# Patient Record
Sex: MALE | Race: OTHER | Employment: OTHER | URBAN - NONMETROPOLITAN AREA
[De-identification: names, ages, dates, MRNs, and addresses within clinical notes are randomized per-mention and may not be internally consistent; named-entity substitution may affect disease eponyms.]

---

## 2016-08-23 LAB
BUN BLDV-MCNC: 15 MG/DL
CHLORIDE: 105
CHLORIDE: NORMAL
CHOLESTEROL, TOTAL: 196 MG/DL
CHOLESTEROL/HDL RATIO: 2.8
CREATININE: 0.9 MG/DL
GFR CALCULATED: NORMAL
HDLC SERPL-MCNC: 3 MG/DL (ref 35–70)
LDL CHOLESTEROL CALCULATED: 71 MG/DL (ref 0–160)
POTASSIUM: 4.1
SODIUM: 142
TRIGL SERPL-MCNC: 62 MG/DL
VLDLC SERPL CALC-MCNC: 12 MG/DL

## 2017-05-17 ENCOUNTER — TELEPHONE (OUTPATIENT)
Dept: FAMILY MEDICINE CLINIC | Age: 62
End: 2017-05-17

## 2017-06-01 DIAGNOSIS — I35.1 AORTIC VALVE INSUFFICIENCY, UNSPECIFIED ETIOLOGY: ICD-10-CM

## 2017-06-01 DIAGNOSIS — F33.9 EPISODE OF RECURRENT MAJOR DEPRESSIVE DISORDER, UNSPECIFIED DEPRESSION EPISODE SEVERITY (HCC): ICD-10-CM

## 2017-06-01 PROBLEM — F32.9 MAJOR DEPRESSION: Status: ACTIVE | Noted: 2017-06-01

## 2017-06-01 RX ORDER — LISINOPRIL 40 MG/1
TABLET ORAL
Refills: 0 | COMMUNITY
Start: 2017-05-27 | End: 2017-06-07 | Stop reason: SDUPTHER

## 2017-06-01 RX ORDER — SPIRONOLACTONE 25 MG/1
TABLET ORAL
Refills: 3 | COMMUNITY
Start: 2017-05-26 | End: 2017-06-07 | Stop reason: SDUPTHER

## 2017-06-01 RX ORDER — CITALOPRAM 40 MG/1
TABLET ORAL
Refills: 3 | COMMUNITY
Start: 2017-05-26 | End: 2017-06-07 | Stop reason: SDUPTHER

## 2017-06-07 ENCOUNTER — OFFICE VISIT (OUTPATIENT)
Dept: FAMILY MEDICINE CLINIC | Age: 62
End: 2017-06-07
Payer: COMMERCIAL

## 2017-06-07 VITALS — HEART RATE: 56 BPM | SYSTOLIC BLOOD PRESSURE: 110 MMHG | DIASTOLIC BLOOD PRESSURE: 70 MMHG | WEIGHT: 158 LBS

## 2017-06-07 DIAGNOSIS — I35.1 AORTIC VALVE INSUFFICIENCY, UNSPECIFIED ETIOLOGY: Primary | ICD-10-CM

## 2017-06-07 DIAGNOSIS — F33.9 EPISODE OF RECURRENT MAJOR DEPRESSIVE DISORDER, UNSPECIFIED DEPRESSION EPISODE SEVERITY (HCC): ICD-10-CM

## 2017-06-07 DIAGNOSIS — K59.1 FUNCTIONAL DIARRHEA: ICD-10-CM

## 2017-06-07 PROCEDURE — 99214 OFFICE O/P EST MOD 30 MIN: CPT | Performed by: FAMILY MEDICINE

## 2017-06-07 RX ORDER — SPIRONOLACTONE 25 MG/1
TABLET ORAL
Qty: 90 TABLET | Refills: 1 | Status: SHIPPED | OUTPATIENT
Start: 2017-06-07 | End: 2017-06-26 | Stop reason: SDUPTHER

## 2017-06-07 RX ORDER — CITALOPRAM 40 MG/1
TABLET ORAL
Qty: 90 TABLET | Refills: 1 | Status: SHIPPED | OUTPATIENT
Start: 2017-06-07 | End: 2017-06-26 | Stop reason: SDUPTHER

## 2017-06-07 RX ORDER — MONTELUKAST SODIUM 4 MG/1
1 TABLET, CHEWABLE ORAL 2 TIMES DAILY
Qty: 60 TABLET | Refills: 3 | Status: SHIPPED | OUTPATIENT
Start: 2017-06-07 | End: 2019-03-06

## 2017-06-07 RX ORDER — LISINOPRIL 40 MG/1
TABLET ORAL
Qty: 90 TABLET | Refills: 2 | Status: SHIPPED | OUTPATIENT
Start: 2017-06-07 | End: 2018-03-28 | Stop reason: SDUPTHER

## 2017-06-07 ASSESSMENT — ENCOUNTER SYMPTOMS
DIARRHEA: 1
SHORTNESS OF BREATH: 1
ABDOMINAL PAIN: 0

## 2017-06-26 DIAGNOSIS — F33.9 EPISODE OF RECURRENT MAJOR DEPRESSIVE DISORDER, UNSPECIFIED DEPRESSION EPISODE SEVERITY (HCC): ICD-10-CM

## 2017-06-26 DIAGNOSIS — I35.1 AORTIC VALVE INSUFFICIENCY, UNSPECIFIED ETIOLOGY: ICD-10-CM

## 2017-06-26 RX ORDER — CITALOPRAM 40 MG/1
TABLET ORAL
Qty: 30 TABLET | Refills: 5 | Status: SHIPPED | OUTPATIENT
Start: 2017-06-26 | End: 2017-08-08 | Stop reason: ALTCHOICE

## 2017-06-26 RX ORDER — SPIRONOLACTONE 25 MG/1
TABLET ORAL
Qty: 90 TABLET | Refills: 1 | Status: SHIPPED | OUTPATIENT
Start: 2017-06-26 | End: 2018-03-02 | Stop reason: SDUPTHER

## 2017-07-03 ENCOUNTER — TELEPHONE (OUTPATIENT)
Dept: FAMILY MEDICINE CLINIC | Age: 62
End: 2017-07-03

## 2017-08-07 LAB
AGE FOR GFR: 61
ANION GAP SERPL CALCULATED.3IONS-SCNC: 12 MMOL/L
CHLORIDE BLD-SCNC: 107 MMOL/L
CO2: 27 MMOL/L
CREAT SERPL-MCNC: 0.9 MG/DL
CREATININE, RANDOM: 122.6 MG/DL
EGFR BF: 77 ML/MIN/1.73 M2
EGFR BM: 104 ML/MIN/1.73 M2
EGFR WF: 64 ML/MIN/1.73 M2
EGFR WM: 86 ML/MIN/1.73 M2
MICROALBUMIN UR-MCNC: <0.6 MG/DL
POTASSIUM SERPL-SCNC: 4.6 MMOL/L
SODIUM BLD-SCNC: 141 MMOL/L

## 2017-08-08 ENCOUNTER — OFFICE VISIT (OUTPATIENT)
Dept: FAMILY MEDICINE CLINIC | Age: 62
End: 2017-08-08
Payer: COMMERCIAL

## 2017-08-08 VITALS — SYSTOLIC BLOOD PRESSURE: 128 MMHG | HEART RATE: 56 BPM | WEIGHT: 158 LBS | DIASTOLIC BLOOD PRESSURE: 70 MMHG

## 2017-08-08 DIAGNOSIS — R79.89 ABNORMAL THYROID BLOOD TEST: ICD-10-CM

## 2017-08-08 DIAGNOSIS — K59.1 FUNCTIONAL DIARRHEA: ICD-10-CM

## 2017-08-08 DIAGNOSIS — F33.41 RECURRENT MAJOR DEPRESSIVE DISORDER, IN PARTIAL REMISSION (HCC): Primary | ICD-10-CM

## 2017-08-08 DIAGNOSIS — I35.1 NONRHEUMATIC AORTIC VALVE INSUFFICIENCY: ICD-10-CM

## 2017-08-08 PROCEDURE — 99214 OFFICE O/P EST MOD 30 MIN: CPT | Performed by: FAMILY MEDICINE

## 2017-08-08 RX ORDER — VENLAFAXINE HYDROCHLORIDE 150 MG/1
150 CAPSULE, EXTENDED RELEASE ORAL DAILY
Qty: 30 CAPSULE | Refills: 3 | Status: SHIPPED | OUTPATIENT
Start: 2017-08-08 | End: 2017-12-04 | Stop reason: SDUPTHER

## 2017-08-08 ASSESSMENT — ENCOUNTER SYMPTOMS
ROS SKIN COMMENTS: DRY AND FLAKEY
SHORTNESS OF BREATH: 1

## 2017-08-29 ENCOUNTER — TELEPHONE (OUTPATIENT)
Dept: FAMILY MEDICINE CLINIC | Age: 62
End: 2017-08-29

## 2017-09-08 DIAGNOSIS — I35.1 AORTIC VALVE REGURGITATION, UNSPECIFIED ETIOLOGY: Primary | ICD-10-CM

## 2017-09-12 ENCOUNTER — TELEPHONE (OUTPATIENT)
Dept: CARDIOLOGY | Age: 62
End: 2017-09-12

## 2017-12-04 LAB
AGE FOR GFR: 61
ANION GAP SERPL CALCULATED.3IONS-SCNC: 15 MMOL/L
CHLORIDE BLD-SCNC: 105 MMOL/L
CO2: 27 MMOL/L
CREAT SERPL-MCNC: 1 MG/DL
EGFR BF: 68 ML/MIN/1.73 M2
EGFR BM: 92 ML/MIN/1.73 M2
EGFR WF: 56 ML/MIN/1.73 M2
EGFR WM: 76 ML/MIN/1.73 M2
POTASSIUM SERPL-SCNC: 4.4 MMOL/L
SODIUM BLD-SCNC: 143 MMOL/L
TSH SERPL DL<=0.05 MIU/L-ACNC: 0.6 MIU/ML

## 2017-12-05 ENCOUNTER — OFFICE VISIT (OUTPATIENT)
Dept: FAMILY MEDICINE CLINIC | Age: 62
End: 2017-12-05
Payer: COMMERCIAL

## 2017-12-05 VITALS
WEIGHT: 164 LBS | BODY MASS INDEX: 23.48 KG/M2 | SYSTOLIC BLOOD PRESSURE: 132 MMHG | HEIGHT: 70 IN | DIASTOLIC BLOOD PRESSURE: 74 MMHG | HEART RATE: 80 BPM

## 2017-12-05 DIAGNOSIS — K59.1 FUNCTIONAL DIARRHEA: ICD-10-CM

## 2017-12-05 DIAGNOSIS — Z23 NEED FOR INFLUENZA VACCINATION: ICD-10-CM

## 2017-12-05 DIAGNOSIS — I35.1 NONRHEUMATIC AORTIC VALVE INSUFFICIENCY: ICD-10-CM

## 2017-12-05 DIAGNOSIS — F33.9 EPISODE OF RECURRENT MAJOR DEPRESSIVE DISORDER, UNSPECIFIED DEPRESSION EPISODE SEVERITY (HCC): Primary | ICD-10-CM

## 2017-12-05 PROCEDURE — 90471 IMMUNIZATION ADMIN: CPT | Performed by: FAMILY MEDICINE

## 2017-12-05 PROCEDURE — 99214 OFFICE O/P EST MOD 30 MIN: CPT | Performed by: FAMILY MEDICINE

## 2017-12-05 PROCEDURE — 90686 IIV4 VACC NO PRSV 0.5 ML IM: CPT | Performed by: FAMILY MEDICINE

## 2017-12-05 RX ORDER — ESCITALOPRAM OXALATE 20 MG/1
20 TABLET ORAL DAILY
Qty: 30 TABLET | Refills: 5 | Status: SHIPPED | OUTPATIENT
Start: 2017-12-05 | End: 2019-03-06

## 2017-12-05 ASSESSMENT — ENCOUNTER SYMPTOMS
SHORTNESS OF BREATH: 1
CHEST TIGHTNESS: 0

## 2017-12-05 NOTE — PROGRESS NOTES
[facility]  [address] [facility]  Dept: [phone]  Dept Fax: [phone]      Slime Frost is a 65 y.o. male who presents today for his medical conditions/complaints as noted below. Slime Frost is c/o of 6 Month Follow-Up; Anxiety; Depression; and Cardiac Valve Problem            HPI:     HPI   Pt feeling BP doing well, feels needs refills    No diarrhea with cholestipol    Then not enjoying venlexafine, no libido and ED noted. Feeling more irritable and impulsive. Feeling aggressive. Having brief thoughts of driving into other wisam. Noted pm fatigue with citalopram resolved with effexor. Mood worse though less SE with change to effexor.       BP Readings from Last 3 Encounters:   12/05/17 132/74   08/08/17 128/70   06/07/17 110/70          (goal 120/80)    Past Medical History:   Diagnosis Date    Aortic insufficiency     Bronchospasm     Depression     Dyspnea       Past Surgical History:   Procedure Laterality Date    HERNIA REPAIR      TONSILLECTOMY         Family History   Problem Relation Age of Onset    High Blood Pressure Mother     Other Mother      dementia    Coronary Art Dis Father     Stroke Father     Cancer Father 80     complications of colon cancer    Stroke Maternal Grandmother        Social History   Substance Use Topics    Smoking status: Former Smoker     Quit date: 2015    Smokeless tobacco: Former User      Comment: Vapor smoker    Alcohol use No      Comment: Last drink 7/9/2017      Current Outpatient Prescriptions   Medication Sig Dispense Refill    escitalopram (LEXAPRO) 20 MG tablet Take 1 tablet by mouth daily 30 tablet 5    spironolactone (ALDACTONE) 25 MG tablet TK 1 T PO D 90 tablet 1    lisinopril (PRINIVIL;ZESTRIL) 40 MG tablet T 1 T PO DAILY 90 tablet 2    colestipol (COLESTID) 1 G tablet Take 1 tablet by mouth 2 times daily May use 1-3 daily for stool 60 tablet 3     No current

## 2018-03-02 NOTE — TELEPHONE ENCOUNTER
Nicola is calling to request a refill on the following medication(s):  Requested Prescriptions     Pending Prescriptions Disp Refills    spironolactone (ALDACTONE) 25 MG tablet 90 tablet 1     Sig: TK 1 T PO D       Last Visit Date (If Applicable):  Visit date not found    Next Visit Date:    Visit date not found

## 2018-03-03 RX ORDER — SPIRONOLACTONE 25 MG/1
TABLET ORAL
Qty: 90 TABLET | Refills: 1 | Status: SHIPPED | OUTPATIENT
Start: 2018-03-03 | End: 2019-03-06

## 2018-03-28 RX ORDER — LISINOPRIL 40 MG/1
TABLET ORAL
Qty: 90 TABLET | Refills: 1 | Status: SHIPPED | OUTPATIENT
Start: 2018-03-28 | End: 2019-03-06

## 2018-03-28 NOTE — TELEPHONE ENCOUNTER
[facility] is faxing to request a refill on the following medication(s):  Requested Prescriptions     Pending Prescriptions Disp Refills    lisinopril (PRINIVIL;ZESTRIL) 40 MG tablet 90 tablet 1     Sig: T 1 T PO DAILY       Last Visit Date (If Applicable):  11/0/5434    Next Visit Date:    6/5/2018

## 2018-10-27 ENCOUNTER — HOSPITAL ENCOUNTER (EMERGENCY)
Age: 63
Discharge: HOME | End: 2018-10-27
Attending: EMERGENCY MEDICINE
Payer: COMMERCIAL

## 2018-10-27 VITALS
BODY MASS INDEX: 24.08 KG/M2 | OXYGEN SATURATION: 95 % | SYSTOLIC BLOOD PRESSURE: 134 MMHG | RESPIRATION RATE: 16 BRPM | DIASTOLIC BLOOD PRESSURE: 78 MMHG | TEMPERATURE: 98.3 F | WEIGHT: 172 LBS | HEIGHT: 71 IN | HEART RATE: 70 BPM

## 2018-10-27 DIAGNOSIS — F32.A DEPRESSION, UNSPECIFIED DEPRESSION TYPE: Primary | ICD-10-CM

## 2018-10-27 DIAGNOSIS — F10.929 ACUTE ALCOHOLIC INTOXICATION WITH COMPLICATION (HCC): ICD-10-CM

## 2018-10-27 LAB
ABSOLUTE EOS #: 0.1 K/UL (ref 0–0.4)
ABSOLUTE IMMATURE GRANULOCYTE: ABNORMAL K/UL (ref 0–0.3)
ABSOLUTE LYMPH #: 1.8 K/UL (ref 1–4.8)
ABSOLUTE MONO #: 0.5 K/UL (ref 0.1–1.3)
ACETAMINOPHEN LEVEL: <5 UG/ML (ref 10–30)
ALBUMIN SERPL-MCNC: 4.5 G/DL (ref 3.5–5.2)
ALBUMIN/GLOBULIN RATIO: ABNORMAL (ref 1–2.5)
ALP BLD-CCNC: 60 U/L (ref 40–129)
ALT SERPL-CCNC: 11 U/L (ref 5–41)
AMPHETAMINE SCREEN URINE: NEGATIVE
ANION GAP SERPL CALCULATED.3IONS-SCNC: 12 MMOL/L (ref 9–17)
AST SERPL-CCNC: 18 U/L
BARBITURATE SCREEN URINE: NEGATIVE
BASOPHILS # BLD: 1 % (ref 0–2)
BASOPHILS ABSOLUTE: 0 K/UL (ref 0–0.2)
BENZODIAZEPINE SCREEN, URINE: NEGATIVE
BILIRUB SERPL-MCNC: 0.19 MG/DL (ref 0.3–1.2)
BUN BLDV-MCNC: 16 MG/DL (ref 8–23)
BUN/CREAT BLD: ABNORMAL (ref 9–20)
BUPRENORPHINE URINE: NORMAL
CALCIUM SERPL-MCNC: 9.5 MG/DL (ref 8.6–10.4)
CANNABINOID SCREEN URINE: NEGATIVE
CHLORIDE BLD-SCNC: 109 MMOL/L (ref 98–107)
CO2: 22 MMOL/L (ref 20–31)
COCAINE METABOLITE, URINE: NEGATIVE
CREAT SERPL-MCNC: 0.91 MG/DL (ref 0.7–1.2)
DIFFERENTIAL TYPE: ABNORMAL
EOSINOPHILS RELATIVE PERCENT: 2 % (ref 0–4)
ETHANOL PERCENT: 0.21 %
ETHANOL: 206 MG/DL
GFR AFRICAN AMERICAN: >60 ML/MIN
GFR NON-AFRICAN AMERICAN: >60 ML/MIN
GFR SERPL CREATININE-BSD FRML MDRD: ABNORMAL ML/MIN/{1.73_M2}
GFR SERPL CREATININE-BSD FRML MDRD: ABNORMAL ML/MIN/{1.73_M2}
GLUCOSE BLD-MCNC: 109 MG/DL (ref 70–99)
HCT VFR BLD CALC: 43.4 % (ref 41–53)
HEMOGLOBIN: 14 G/DL (ref 13.5–17.5)
IMMATURE GRANULOCYTES: ABNORMAL %
LYMPHOCYTES # BLD: 35 % (ref 24–44)
MCH RBC QN AUTO: 28.6 PG (ref 26–34)
MCHC RBC AUTO-ENTMCNC: 32.3 G/DL (ref 31–37)
MCV RBC AUTO: 88.5 FL (ref 80–100)
MDMA URINE: NORMAL
METHADONE SCREEN, URINE: NEGATIVE
METHAMPHETAMINE, URINE: NORMAL
MONOCYTES # BLD: 10 % (ref 1–7)
NRBC AUTOMATED: ABNORMAL PER 100 WBC
OPIATES, URINE: NEGATIVE
OXYCODONE SCREEN URINE: NEGATIVE
PDW BLD-RTO: 15.6 % (ref 11.5–14.9)
PHENCYCLIDINE, URINE: NEGATIVE
PLATELET # BLD: 241 K/UL (ref 150–450)
PLATELET ESTIMATE: ABNORMAL
PMV BLD AUTO: 8.6 FL (ref 6–12)
POTASSIUM SERPL-SCNC: 4.3 MMOL/L (ref 3.7–5.3)
PROPOXYPHENE, URINE: NORMAL
RBC # BLD: 4.9 M/UL (ref 4.5–5.9)
RBC # BLD: ABNORMAL 10*6/UL
SALICYLATE LEVEL: <1 MG/DL (ref 3–10)
SEG NEUTROPHILS: 52 % (ref 36–66)
SEGMENTED NEUTROPHILS ABSOLUTE COUNT: 2.7 K/UL (ref 1.3–9.1)
SODIUM BLD-SCNC: 143 MMOL/L (ref 135–144)
TEST INFORMATION: NORMAL
TOTAL PROTEIN: 7.3 G/DL (ref 6.4–8.3)
TRICYCLIC ANTIDEPRESSANTS, UR: NORMAL
WBC # BLD: 5.1 K/UL (ref 3.5–11)
WBC # BLD: ABNORMAL 10*3/UL

## 2018-10-27 PROCEDURE — 99284 EMERGENCY DEPT VISIT MOD MDM: CPT

## 2018-10-27 PROCEDURE — 80307 DRUG TEST PRSMV CHEM ANLYZR: CPT

## 2018-10-27 PROCEDURE — G0480 DRUG TEST DEF 1-7 CLASSES: HCPCS

## 2018-10-27 PROCEDURE — 36415 COLL VENOUS BLD VENIPUNCTURE: CPT

## 2018-10-27 PROCEDURE — 85025 COMPLETE CBC W/AUTO DIFF WBC: CPT

## 2018-10-27 PROCEDURE — 80053 COMPREHEN METABOLIC PANEL: CPT

## 2018-10-27 ASSESSMENT — SLEEP AND FATIGUE QUESTIONNAIRES
DO YOU HAVE DIFFICULTY SLEEPING: NO
DO YOU USE A SLEEP AID: NO
AVERAGE NUMBER OF SLEEP HOURS: 10

## 2018-10-27 NOTE — ED PROVIDER NOTES
[address]  eMERGENCY dEPARTMENT eNCOUnter    Pt Name: Nan Dickerson  MRN: [id number]  Adrianagfhuy 1955  Date of evaluation: 10/27/18  CHIEF COMPLAINT       Chief Complaint   Patient presents with    Depression     HISTORY OF PRESENT ILLNESS   The pt presents for mental health evaluation. He is depressed. He was staying at [facility]. He was found wandering and staff asked him to go back to his room. He stated that they would find him in a pool of blood. Police were called and pt reiterated the statements to them. Pt is intoxicated. Pt denies any medical complaints at this time. The history is provided by the patient. Mental Health Problem   Presenting symptoms: depression and suicidal thoughts    Degree of incapacity (severity):  Severe  Onset quality:  Sudden  Duration:  1 day  Timing:  Constant  Progression:  Unchanged  Chronicity:  Recurrent  Context: alcohol use    Treatment compliance: All of the time  Relieved by:  Nothing  Worsened by:  Alcohol  Ineffective treatments:  None tried      REVIEW OF SYSTEMS     Review of Systems   Unable to perform ROS: Psychiatric disorder   Psychiatric/Behavioral: Positive for suicidal ideas. PASTMEDICAL HISTORY     Past Medical History:   Diagnosis Date    Aortic insufficiency     Bronchospasm     Depression     Dyspnea      SURGICAL HISTORY       Past Surgical History:   Procedure Laterality Date    HERNIA REPAIR      TONSILLECTOMY       CURRENT MEDICATIONS       Previous Medications    COLESTIPOL (COLESTID) 1 G TABLET    Take 1 tablet by mouth 2 times daily May use 1-3 daily for stool    ESCITALOPRAM (LEXAPRO) 20 MG TABLET    Take 1 tablet by mouth daily    LISINOPRIL (PRINIVIL;ZESTRIL) 40 MG TABLET    T 1 T PO DAILY    SPIRONOLACTONE (ALDACTONE) 25 MG TABLET    TK 1 T PO D     ALLERGIES     is allergic to vicodin [hydrocodone-acetaminophen]. FAMILY HISTORY     indicated that his mother is alive.  He indicated that his father is

## 2019-03-06 ENCOUNTER — OFFICE VISIT (OUTPATIENT)
Dept: FAMILY MEDICINE CLINIC | Age: 64
End: 2019-03-06
Payer: COMMERCIAL

## 2019-03-06 VITALS
BODY MASS INDEX: 24.64 KG/M2 | DIASTOLIC BLOOD PRESSURE: 72 MMHG | SYSTOLIC BLOOD PRESSURE: 136 MMHG | OXYGEN SATURATION: 98 % | HEIGHT: 71 IN | HEART RATE: 66 BPM | WEIGHT: 176 LBS

## 2019-03-06 DIAGNOSIS — F10.10 ALCOHOL ABUSE: ICD-10-CM

## 2019-03-06 DIAGNOSIS — I10 ESSENTIAL HYPERTENSION: ICD-10-CM

## 2019-03-06 DIAGNOSIS — F33.1 MODERATE EPISODE OF RECURRENT MAJOR DEPRESSIVE DISORDER (HCC): Primary | ICD-10-CM

## 2019-03-06 DIAGNOSIS — Z13.31 POSITIVE DEPRESSION SCREENING: ICD-10-CM

## 2019-03-06 DIAGNOSIS — I35.1 NONRHEUMATIC AORTIC VALVE INSUFFICIENCY: ICD-10-CM

## 2019-03-06 PROCEDURE — G8431 POS CLIN DEPRES SCRN F/U DOC: HCPCS | Performed by: FAMILY MEDICINE

## 2019-03-06 PROCEDURE — 99215 OFFICE O/P EST HI 40 MIN: CPT | Performed by: FAMILY MEDICINE

## 2019-03-06 RX ORDER — BUPROPION HYDROCHLORIDE 150 MG/1
150 TABLET, EXTENDED RELEASE ORAL 2 TIMES DAILY
Qty: 60 TABLET | Refills: 3 | Status: SHIPPED | OUTPATIENT
Start: 2019-03-06 | End: 2019-04-08 | Stop reason: SDUPTHER

## 2019-03-06 ASSESSMENT — PATIENT HEALTH QUESTIONNAIRE - PHQ9
SUM OF ALL RESPONSES TO PHQ QUESTIONS 1-9: 15
1. LITTLE INTEREST OR PLEASURE IN DOING THINGS: 3
10. IF YOU CHECKED OFF ANY PROBLEMS, HOW DIFFICULT HAVE THESE PROBLEMS MADE IT FOR YOU TO DO YOUR WORK, TAKE CARE OF THINGS AT HOME, OR GET ALONG WITH OTHER PEOPLE: 3
SUM OF ALL RESPONSES TO PHQ9 QUESTIONS 1 & 2: 6
9. THOUGHTS THAT YOU WOULD BE BETTER OFF DEAD, OR OF HURTING YOURSELF: 1
4. FEELING TIRED OR HAVING LITTLE ENERGY: 2
7. TROUBLE CONCENTRATING ON THINGS, SUCH AS READING THE NEWSPAPER OR WATCHING TELEVISION: 1
5. POOR APPETITE OR OVEREATING: 1
SUM OF ALL RESPONSES TO PHQ QUESTIONS 1-9: 15
3. TROUBLE FALLING OR STAYING ASLEEP: 1
8. MOVING OR SPEAKING SO SLOWLY THAT OTHER PEOPLE COULD HAVE NOTICED. OR THE OPPOSITE, BEING SO FIGETY OR RESTLESS THAT YOU HAVE BEEN MOVING AROUND A LOT MORE THAN USUAL: 1
2. FEELING DOWN, DEPRESSED OR HOPELESS: 3
6. FEELING BAD ABOUT YOURSELF - OR THAT YOU ARE A FAILURE OR HAVE LET YOURSELF OR YOUR FAMILY DOWN: 2

## 2019-03-14 DIAGNOSIS — I35.1 NONRHEUMATIC AORTIC VALVE INSUFFICIENCY: ICD-10-CM

## 2019-04-08 ENCOUNTER — OFFICE VISIT (OUTPATIENT)
Dept: FAMILY MEDICINE CLINIC | Age: 64
End: 2019-04-08
Payer: COMMERCIAL

## 2019-04-08 VITALS
DIASTOLIC BLOOD PRESSURE: 72 MMHG | WEIGHT: 177 LBS | OXYGEN SATURATION: 98 % | HEIGHT: 71 IN | HEART RATE: 60 BPM | BODY MASS INDEX: 24.78 KG/M2 | SYSTOLIC BLOOD PRESSURE: 136 MMHG

## 2019-04-08 DIAGNOSIS — F33.41 RECURRENT MAJOR DEPRESSIVE DISORDER, IN PARTIAL REMISSION (HCC): Primary | ICD-10-CM

## 2019-04-08 DIAGNOSIS — I10 ESSENTIAL HYPERTENSION: ICD-10-CM

## 2019-04-08 DIAGNOSIS — Z12.11 COLON CANCER SCREENING: ICD-10-CM

## 2019-04-08 DIAGNOSIS — I35.1 NONRHEUMATIC AORTIC VALVE INSUFFICIENCY: ICD-10-CM

## 2019-04-08 DIAGNOSIS — F10.21 ALCOHOL DEPENDENCE IN REMISSION (HCC): ICD-10-CM

## 2019-04-08 PROCEDURE — 99214 OFFICE O/P EST MOD 30 MIN: CPT | Performed by: FAMILY MEDICINE

## 2019-04-08 RX ORDER — BUPROPION HYDROCHLORIDE 200 MG/1
200 TABLET, EXTENDED RELEASE ORAL 2 TIMES DAILY
Qty: 60 TABLET | Refills: 5 | Status: SHIPPED | OUTPATIENT
Start: 2019-04-08 | End: 2019-10-23 | Stop reason: SDUPTHER

## 2019-04-08 RX ORDER — TRAZODONE HYDROCHLORIDE 150 MG/1
150 TABLET ORAL NIGHTLY
Qty: 30 TABLET | Refills: 3 | Status: SHIPPED | OUTPATIENT
Start: 2019-04-08 | End: 2019-04-15 | Stop reason: DRUGHIGH

## 2019-04-08 NOTE — PROGRESS NOTES
[address] [facility]  Dept: [phone]  Dept Fax:[phone]      Chucho Finn is a 59 y.o. male who presents today for his medicalconditions/complaints as noted below. Chucho Finn is c/o of Other (6 week follow up) and Depression      HPI:      HPI   Here for follow up of depression. and HTN  Taking all medications regularly  No side effects noted    No other complaint currently, has noted less concentration. Able to focus on physical things.   Sleep noting severe x 4 days and awakening at 330 am after starting medications, awakening yet daily    Has cut nearly out of alcohol    BP Readings from Last 3 Encounters:   19 136/72   19 136/72   10/27/18 134/78          (goal 120/80)    Past Medical History:   Diagnosis Date    Aortic regurgitation 2019    moderate to severe per ECHO    Bronchospasm     Depression     Dyspnea       Past Surgical History:   Procedure Laterality Date    HERNIA REPAIR      TONSILLECTOMY         Family History   Problem Relation Age of Onset    High Blood Pressure Mother     Other Mother         dementia    Coronary Art Dis Father     Stroke Father     Cancer Father 80        complications of colon cancer    Stroke Maternal Grandmother           Social History     Tobacco Use    Smoking status: Former Smoker     Packs/day: 1.00     Years: 10.00     Pack years: 10.00     Types: Cigarettes     Last attempt to quit:      Years since quittin.2    Smokeless tobacco: Former User   Substance Use Topics    Alcohol use: Yes     Comment: Multiple drinks every day, depends on the day         Current Outpatient Medications   Medication Sig Dispense Refill    buPROPion (WELLBUTRIN SR) 200 MG extended release tablet Take 1 tablet by mouth 2 times daily 60 tablet 5    traZODone (DESYREL) 150 MG tablet Take 1 tablet by mouth nightly May begin with 1/2 tab at night 1st week 30 tablet 3     No current facility-administered medications for this visit. Allergies   Allergen Reactions    Vicodin [Hydrocodone-Acetaminophen] Itching       Health Maintenance   Topic Date Due    Hepatitis C screen  1955    Pneumococcal 0-64 years at Risk Vaccine (1 of 1 - PPSV23) 07/31/1962    HIV screen  07/31/1971    DTaP/Tdap/Td vaccine (1 - Tdap) 07/31/1975    Shingles Vaccine (1 of 2) 07/31/2006    Colon cancer screen colonoscopy  07/31/2006    Flu vaccine (Season Ended) 09/01/2019    Lipid screen  08/23/2021       Subjective:      Review of Systems   Constitutional: Negative for activity change, appetite change and unexpected weight change. Psychiatric/Behavioral: Negative for decreased concentration and sleep disturbance. The patient is not nervous/anxious (panic attacks). Feels like he is not concentrating or focusing on things like he should. He said that he is having more good days than bad, and thinks it is working some, but may not be strong enough yet. He is no longer drinking at home, he has only had 4-5 beers (not at one time). He does not feel that he is an alcoholic, feels that he was just using it to get through a \"funk\" and he knows now that this was not a good decision. Would like to discuss his options for getting \"healthier\"       Mood: \"More good days than bad\"  Suicidal thoughts? no  PreviousPsychiatrist/Counseling? no    Objective:     /72   Pulse 60   Ht 5' 11\" (1.803 m)   Wt 177 lb (80.3 kg)   SpO2 98%   BMI 24.69 kg/m²    Physical Exam   Constitutional: He is oriented to person, place, and time. He appears well-developed and well-nourished. No distress. HENT:   Head: Atraumatic. Eyes: Conjunctivae are normal.   Neck: Neck supple. Carotid bruit is not present. No thyromegaly present. Cardiovascular: Normal rate and regular rhythm. Pulmonary/Chest: Effort normal and breath sounds normal. He has no wheezes. He has no rales. Abdominal: Soft.  Bowel sounds are normal.   Musculoskeletal: He exhibits no edema. Right lower leg: He exhibits no swelling. Left lower leg: He exhibits no swelling. Lymphadenopathy:     He has no cervical adenopathy. Neurological: He is alert and oriented to person, place, and time. Psychiatric: He has a normal mood and affect. Assessment:      1. Recurrent major depressive disorder, in partial remission ([facility] [address]    2. Essential hypertension    3. Alcohol dependence in remission ([facility] [address]    4. Nonrheumatic aortic valve insufficiency    5. Colon cancer screening                     Plan:     There are no Patient Instructions on file for this visit. Orders Placed This Encounter   Procedures    [employer]     This test is performed by an external laboratory and is used for result attachment only. Please fill out the appropriate paperwork required by the processing laboratory. See [email or web address] for further information. Standing Status:   Future     Standing Expiration Date:   4/8/2020     Orders Placed This Encounter   Medications    buPROPion (WELLBUTRIN SR) 200 MG extended release tablet     Sig: Take 1 tablet by mouth 2 times daily     Dispense:  60 tablet     Refill:  5    traZODone (DESYREL) 150 MG tablet     Sig: Take 1 tablet by mouth nightly May begin with 1/2 tab at night 1st week     Dispense:  30 tablet     Refill:  3        Return in about 3 months (around 6/25/2019) for depression, . Discussed use, benefit, and side effects of prescribed medications. All patient questions answered. Pt voiced understanding. Reviewed health maintenance, reviewed shingrix, cologuard. Instructed to continue current medications, diet and exercise. Patient agreed with treatment plan. Follow up as directed.      Electronically signedby Bridget Echavarria MD on 4/8/2019

## 2019-04-15 ENCOUNTER — PATIENT MESSAGE (OUTPATIENT)
Dept: FAMILY MEDICINE CLINIC | Age: 64
End: 2019-04-15

## 2019-04-15 RX ORDER — TRAZODONE HYDROCHLORIDE 50 MG/1
50 TABLET ORAL NIGHTLY PRN
Qty: 30 TABLET | Refills: 5 | Status: SHIPPED | OUTPATIENT
Start: 2019-04-15 | End: 2019-10-23 | Stop reason: SDUPTHER

## 2019-06-26 ENCOUNTER — OFFICE VISIT (OUTPATIENT)
Dept: FAMILY MEDICINE CLINIC | Age: 64
End: 2019-06-26
Payer: COMMERCIAL

## 2019-06-26 VITALS
WEIGHT: 171.8 LBS | HEART RATE: 62 BPM | SYSTOLIC BLOOD PRESSURE: 126 MMHG | HEIGHT: 71 IN | DIASTOLIC BLOOD PRESSURE: 82 MMHG | BODY MASS INDEX: 24.05 KG/M2 | OXYGEN SATURATION: 97 %

## 2019-06-26 DIAGNOSIS — Z23 NEED FOR PNEUMOCOCCAL VACCINATION: ICD-10-CM

## 2019-06-26 DIAGNOSIS — I35.1 NONRHEUMATIC AORTIC VALVE INSUFFICIENCY: ICD-10-CM

## 2019-06-26 DIAGNOSIS — F33.1 MODERATE EPISODE OF RECURRENT MAJOR DEPRESSIVE DISORDER (HCC): Primary | ICD-10-CM

## 2019-06-26 DIAGNOSIS — R41.3 MEMORY DEFICIT: ICD-10-CM

## 2019-06-26 DIAGNOSIS — I10 ESSENTIAL HYPERTENSION: ICD-10-CM

## 2019-06-26 PROBLEM — F32.9 MAJOR DEPRESSION: Status: RESOLVED | Noted: 2017-06-01 | Resolved: 2019-06-26

## 2019-06-26 PROCEDURE — 99214 OFFICE O/P EST MOD 30 MIN: CPT | Performed by: FAMILY MEDICINE

## 2019-06-26 PROCEDURE — 90471 IMMUNIZATION ADMIN: CPT | Performed by: FAMILY MEDICINE

## 2019-06-26 PROCEDURE — 90732 PPSV23 VACC 2 YRS+ SUBQ/IM: CPT | Performed by: FAMILY MEDICINE

## 2019-06-26 NOTE — PROGRESS NOTES
[address] [facility]  Dept: [phone]  Dept Fax:[phone]      Timoteo Rivera is a 59 y.o. male who presents today for his medical conditions/complaints as noted below. Timoteo Rivera is c/o of Depression      HPI:      HPI  Here for follow up of recurrent major depression and HTN  Taking all medications regularly  No side effects noted    No other complaint currently, no current alcohol per pt/spouse. BP Readings from Last 3 Encounters:   19 126/82   19 136/72   19 136/72          (goal 120/80)    Past Medical History:   Diagnosis Date    Aortic regurgitation 2019    moderate to severe per ECHO    Bronchospasm     Depression     Dyspnea       Past Surgical History:   Procedure Laterality Date    HERNIA REPAIR      TONSILLECTOMY         Family History   Problem Relation Age of Onset    High Blood Pressure Mother     Other Mother         dementia    Coronary Art Dis Father     Stroke Father     Cancer Father 80        complications of colon cancer    Stroke Maternal Grandmother           Social History     Tobacco Use    Smoking status: Former Smoker     Packs/day: 1.00     Years: 10.00     Pack years: 10.00     Types: Cigarettes     Last attempt to quit: 2019     Years since quittin.0    Smokeless tobacco: Former User   Substance Use Topics    Alcohol use: Yes     Comment: couple beers per week         Current Outpatient Medications   Medication Sig Dispense Refill    traZODone (DESYREL) 50 MG tablet Take 1 tablet by mouth nightly as needed for Sleep 30 tablet 5    buPROPion (WELLBUTRIN SR) 200 MG extended release tablet Take 1 tablet by mouth 2 times daily 60 tablet 5     No current facility-administered medications for this visit.       Allergies   Allergen Reactions    Vicodin [Hydrocodone-Acetaminophen] Itching       Health Maintenance   Topic Date Due    Hepatitis C screen  1955    Pneumococcal 0-64 years Vaccine (1 of 1 - PPSV23) 07/31/1962    HIV screen  07/31/1971    DTaP/Tdap/Td vaccine (1 - Tdap) 07/31/1975    Shingles Vaccine (1 of 2) 07/31/2006    Colon cancer screen colonoscopy  07/31/2006    Flu vaccine (Season Ended) 09/01/2019    Potassium monitoring  10/27/2019    Creatinine monitoring  10/27/2019    Lipid screen  08/23/2021       Subjective:      Review of Systems   Constitutional: Negative for appetite change and fatigue. Psychiatric/Behavioral: Positive for agitation (just a little bit because he is quitting smoking) and decreased concentration (gets distracted more easily now). Negative for self-injury and sleep disturbance. The patient is not nervous/anxious. Mood seems to vary with weather, is more happy when weather is nice and feels more down when is rainy. In general does believe Wellbutrin is helping his mood    Having some memory loss       Mood: pretty good when weather is nice  Suicidal thoughts? no  PreviousPsychiatrist/Counseling? no    Objective:     /82 (Site: Left Upper Arm, Position: Sitting, Cuff Size: Small Adult)   Pulse 62   Ht 5' 11\" (1.803 m)   Wt 171 lb 12.8 oz (77.9 kg)   SpO2 97%   BMI 23.96 kg/m²    Physical Exam   Constitutional: He is oriented to person, place, and time. He appears well-developed and well-nourished. No distress. HENT:   Head: Atraumatic. Eyes: Conjunctivae are normal.   Neck: Neck supple. Carotid bruit is not present. No thyromegaly present. Cardiovascular: Normal rate and regular rhythm. No murmur heard. Pulmonary/Chest: Effort normal and breath sounds normal.   Abdominal: Bowel sounds are normal.   Musculoskeletal: He exhibits no edema. Right lower leg: He exhibits no swelling. Left lower leg: He exhibits no swelling. Lymphadenopathy:     He has no cervical adenopathy. Neurological: He is alert and oriented to person, place, and time.    Psychiatric: He has a normal mood and affect. Assessment:      1. Moderate episode of recurrent major depressive disorder ([facility] [address]    2. Essential hypertension    3. Nonrheumatic aortic valve insufficiency    4. Memory deficit    5. Need for pneumococcal vaccination               Plan:     Patient Instructions   shingrix waiting list.    Orders Placed This Encounter   Procedures    CT HEAD WO CONTRAST     Standing Status:   Future     Standing Expiration Date:   6/26/2020     No orders of the defined types were placed in this encounter. Return in about 3 months (around 9/26/2019). MMSE today for memory issues. Has cologuard still at home          Discussed use, benefit, and side effects of prescribed medications. All patient questions answered. Pt voiced understanding. Reviewed health maintenance pnuemovax, shingrix waiting list.  Instructed to continue current medications, diet and exercise. Patient agreed with treatment plan. Follow up as directed.      Electronically signedby Allyssa Chaudhari MD on 6/26/2019

## 2019-10-30 ENCOUNTER — OFFICE VISIT (OUTPATIENT)
Dept: FAMILY MEDICINE CLINIC | Age: 64
End: 2019-10-30
Payer: COMMERCIAL

## 2019-10-30 VITALS
BODY MASS INDEX: 23.94 KG/M2 | SYSTOLIC BLOOD PRESSURE: 180 MMHG | DIASTOLIC BLOOD PRESSURE: 92 MMHG | HEART RATE: 66 BPM | OXYGEN SATURATION: 97 % | HEIGHT: 71 IN | WEIGHT: 171 LBS | TEMPERATURE: 98.1 F

## 2019-10-30 DIAGNOSIS — Z23 IMMUNIZATION DUE: ICD-10-CM

## 2019-10-30 DIAGNOSIS — I35.1 NONRHEUMATIC AORTIC VALVE INSUFFICIENCY: ICD-10-CM

## 2019-10-30 DIAGNOSIS — F33.1 MODERATE EPISODE OF RECURRENT MAJOR DEPRESSIVE DISORDER (HCC): ICD-10-CM

## 2019-10-30 DIAGNOSIS — I10 ESSENTIAL HYPERTENSION: Primary | ICD-10-CM

## 2019-10-30 PROCEDURE — 99215 OFFICE O/P EST HI 40 MIN: CPT | Performed by: FAMILY MEDICINE

## 2019-10-30 PROCEDURE — 90471 IMMUNIZATION ADMIN: CPT | Performed by: FAMILY MEDICINE

## 2019-10-30 PROCEDURE — 90686 IIV4 VACC NO PRSV 0.5 ML IM: CPT | Performed by: FAMILY MEDICINE

## 2019-11-06 ENCOUNTER — TELEPHONE (OUTPATIENT)
Dept: FAMILY MEDICINE CLINIC | Age: 64
End: 2019-11-06

## 2020-01-30 ENCOUNTER — OFFICE VISIT (OUTPATIENT)
Dept: FAMILY MEDICINE CLINIC | Age: 65
End: 2020-01-30
Payer: COMMERCIAL

## 2020-01-30 VITALS
DIASTOLIC BLOOD PRESSURE: 74 MMHG | HEART RATE: 62 BPM | BODY MASS INDEX: 24.13 KG/M2 | WEIGHT: 173 LBS | OXYGEN SATURATION: 98 % | SYSTOLIC BLOOD PRESSURE: 150 MMHG

## 2020-01-30 PROCEDURE — 99214 OFFICE O/P EST MOD 30 MIN: CPT | Performed by: FAMILY MEDICINE

## 2020-01-30 RX ORDER — TRAZODONE HYDROCHLORIDE 50 MG/1
TABLET ORAL
Qty: 30 TABLET | Refills: 5 | Status: SHIPPED | OUTPATIENT
Start: 2020-01-30 | End: 2020-07-28

## 2020-01-30 RX ORDER — BUPROPION HYDROCHLORIDE 200 MG/1
TABLET, EXTENDED RELEASE ORAL
Qty: 60 TABLET | Refills: 5 | Status: SHIPPED | OUTPATIENT
Start: 2020-01-30 | End: 2020-08-06 | Stop reason: SDUPTHER

## 2020-01-30 RX ORDER — AMLODIPINE BESYLATE 2.5 MG/1
2.5 TABLET ORAL DAILY
Qty: 30 TABLET | Refills: 5 | Status: SHIPPED | OUTPATIENT
Start: 2020-01-30 | End: 2020-07-28

## 2020-01-30 ASSESSMENT — ENCOUNTER SYMPTOMS: SHORTNESS OF BREATH: 1

## 2020-01-30 NOTE — PROGRESS NOTES
[address] [facility]  Dept: [phone]  Dept Fax:[phone]     Sunny Zimmer is a 62 y.o. male who presents today for his medical conditions/complaints as noted below. Sunny Zimmer is c/o of Hypertension (follow up) and Depression      HPI:     HPI   Here for follow up of Depression and HTN  Taking all medications regularly  No side effects noted    No other complaint currently      BP Readings from Last 3 Encounters:   20 (!) 150/74   10/30/19 (!) 180/92   19 126/82          (goal 120/80)    Past Medical History:   Diagnosis Date    Aortic regurgitation 2019    moderate to severe per ECHO    Bronchospasm     Depression     Dyspnea       Past Surgical History:   Procedure Laterality Date    HERNIA REPAIR      TONSILLECTOMY         Family History   Problem Relation Age of Onset    High Blood Pressure Mother     Other Mother         dementia    Coronary Art Dis Father     Stroke Father     Cancer Father 80        complications of colon cancer    Stroke Maternal Grandmother        Social History     Tobacco Use    Smoking status: Former Smoker     Packs/day: 1.00     Years: 10.00     Pack years: 10.00     Types: Cigarettes     Last attempt to quit: 2019     Years since quittin.6    Smokeless tobacco: Former User   Substance Use Topics    Alcohol use: Yes     Comment: couple beers per week      Current Outpatient Medications   Medication Sig Dispense Refill    traZODone (DESYREL) 50 MG tablet TAKE 1 TABLET BY MOUTH EVERY NIGHT AS NEEDED FOR SLEEP 30 tablet 5    buPROPion (WELLBUTRIN SR) 200 MG extended release tablet TAKE 1 TABLET BY MOUTH TWICE DAILY 60 tablet 5    amLODIPine (NORVASC) 2.5 MG tablet Take 1 tablet by mouth daily 30 tablet 5     No current facility-administered medications for this visit.       Allergies   Allergen Reactions    Vicodin [Hydrocodone-Acetaminophen] Itching       Health Maintenance   Topic Date Due    Hepatitis C screen  1955    DTaP/Tdap/Td vaccine (1 - Tdap) 07/31/1967    HIV screen  07/31/1971    Shingles Vaccine (1 of 2) 07/31/2006    Colon cancer screen colonoscopy  07/31/2006    Potassium monitoring  10/27/2019    Creatinine monitoring  10/27/2019    Lipid screen  08/23/2021    Flu vaccine  Completed    Pneumococcal 0-64 years Vaccine  Completed       Subjective:      Review of Systems   Respiratory: Positive for shortness of breath. States always has SOB with exertion    Cardiovascular: Negative for chest pain, palpitations and leg swelling. Home BP Checks? No  Medication Compliant? yes    Objective:     BP (!) 150/74 (Site: Left Upper Arm, Position: Sitting, Cuff Size: Large Adult)   Pulse 62   Wt 173 lb (78.5 kg)   SpO2 98%   BMI 24.13 kg/m²   Physical Exam  Vitals signs reviewed. Constitutional:       General: He is not in acute distress. Appearance: He is well-developed. HENT:      Head: Normocephalic and atraumatic. Eyes:      Conjunctiva/sclera: Conjunctivae normal.   Neck:      Musculoskeletal: Neck supple. Thyroid: No thyromegaly. Vascular: No carotid bruit. Cardiovascular:      Rate and Rhythm: Normal rate and regular rhythm. Heart sounds: No murmur. Pulmonary:      Effort: Pulmonary effort is normal.      Breath sounds: Normal breath sounds. Musculoskeletal:         General: No swelling (BLE). Right lower leg: He exhibits no swelling. Left lower leg: He exhibits no swelling. Lymphadenopathy:      Cervical: No cervical adenopathy. Neurological:      Mental Status: He is alert and oriented to person, place, and time. Assessment:      1. Moderate episode of recurrent major depressive disorder ([address]    2. Essential hypertension    3. SANTIAGO (dyspnea on exertion)    4.  Encounter for hepatitis C screening test for low risk patient             Plan:     Patient Instructions   shingrix

## 2020-02-11 LAB
ANION GAP SERPL CALCULATED.3IONS-SCNC: 7.3 MMOL/L
B-TYPE NATRIURETIC PEPTIDE: 30.6 PG/ML (ref 0–100)
BASOPHILS %: 1.9 (ref 0–3)
BASOPHILS ABSOLUTE: 0.08 (ref 0–0.3)
CHLORIDE BLD-SCNC: 105 MMOL/L (ref 98–120)
CO2: 27 MMOL/L (ref 22–31)
CREAT SERPL-MCNC: 1.3 MG/DL (ref 0.7–1.3)
EOSINOPHILS %: 1.3 (ref 0–10)
EOSINOPHILS ABSOLUTE: 0.05 (ref 0–1.1)
GFR CALCULATED: 59.3
HCT VFR BLD CALC: 44 % (ref 42–52)
HEMOGLOBIN: 13.6 (ref 13.8–17.8)
LYMPHOCYTE %: 47.47 (ref 20–51.1)
LYMPHOCYTES ABSOLUTE: 1.96 (ref 1–5.5)
MCH RBC QN AUTO: 28.2 PG (ref 28.5–32.5)
MCHC RBC AUTO-ENTMCNC: 30.9 G/DL (ref 32–37)
MCV RBC AUTO: 91.1 FL (ref 80–94)
MONOCYTES %: 12.71 (ref 1.7–9.3)
MONOCYTES ABSOLUTE: 0.52 (ref 0.1–1)
NEUTROPHILS %: 36.63 (ref 42.2–75.2)
NEUTROPHILS ABSOLUTE: 1.51 (ref 2–8.1)
PDW BLD-RTO: 12.9 % (ref 10–15.5)
PLATELET # BLD: 289.9 THOU/MM3 (ref 130–400)
POTASSIUM SERPL-SCNC: 4.3 MMOL/L (ref 3.6–5)
RBC: 4.83 M/UL (ref 4.7–6.1)
SODIUM BLD-SCNC: 140 MMOL/L (ref 135–145)
WBC: 4.1 THOU/ML3 (ref 4.8–10.8)

## 2020-02-12 LAB
ANTIBODY: NORMAL
HEPATITIS C ANTIBODY: NORMAL

## 2020-08-06 ENCOUNTER — OFFICE VISIT (OUTPATIENT)
Dept: FAMILY MEDICINE CLINIC | Age: 65
End: 2020-08-06
Payer: COMMERCIAL

## 2020-08-06 VITALS
WEIGHT: 174 LBS | HEIGHT: 71 IN | BODY MASS INDEX: 24.36 KG/M2 | SYSTOLIC BLOOD PRESSURE: 130 MMHG | DIASTOLIC BLOOD PRESSURE: 84 MMHG | HEART RATE: 74 BPM | OXYGEN SATURATION: 98 %

## 2020-08-06 PROCEDURE — 99214 OFFICE O/P EST MOD 30 MIN: CPT | Performed by: FAMILY MEDICINE

## 2020-08-06 RX ORDER — AMLODIPINE BESYLATE 2.5 MG/1
2.5 TABLET ORAL DAILY
Qty: 90 TABLET | Refills: 1 | Status: SHIPPED | OUTPATIENT
Start: 2020-08-06 | End: 2021-02-08 | Stop reason: SDUPTHER

## 2020-08-06 RX ORDER — BUPROPION HYDROCHLORIDE 200 MG/1
TABLET, EXTENDED RELEASE ORAL
Qty: 60 TABLET | Refills: 5 | Status: SHIPPED | OUTPATIENT
Start: 2020-08-06 | End: 2021-03-08 | Stop reason: SDUPTHER

## 2020-08-06 RX ORDER — TRAZODONE HYDROCHLORIDE 50 MG/1
TABLET ORAL
Qty: 90 TABLET | Refills: 1 | Status: SHIPPED | OUTPATIENT
Start: 2020-08-06 | End: 2021-05-23

## 2020-08-06 ASSESSMENT — ENCOUNTER SYMPTOMS: SHORTNESS OF BREATH: 0

## 2020-08-06 NOTE — PROGRESS NOTES
Topic Date Due    HIV screen  07/31/1971    DTaP/Tdap/Td vaccine (1 - Tdap) 07/31/1975    Shingles Vaccine (1 of 2) 07/31/2006    Colon cancer screen colonoscopy  07/31/2006    Flu vaccine (1) 09/01/2020    Potassium monitoring  02/11/2021    Creatinine monitoring  02/11/2021    Lipid screen  08/23/2021    Pneumococcal 0-64 years Vaccine  Completed    Hepatitis C screen  Completed    Hepatitis A vaccine  Aged Out    Hepatitis B vaccine  Aged Out    Hib vaccine  Aged Out    Meningococcal (ACWY) vaccine  Aged Out       Subjective:      Review of Systems   Constitutional: Positive for fatigue (with activity). Eyes: Negative for visual disturbance. Respiratory: Negative for shortness of breath (only upon excertion). Cardiovascular: Negative for chest pain, palpitations and leg swelling. Genitourinary: Negative for frequency. Neurological: Negative for dizziness. Home BP Checks?no  Medication Compliant? yes    Objective:     /84 (Site: Left Upper Arm, Position: Sitting, Cuff Size: Small Adult)   Pulse 74   Ht 5' 11\" (1.803 m)   Wt 174 lb (78.9 kg)   SpO2 98%   BMI 24.27 kg/m²   Physical Exam  Constitutional:       General: He is not in acute distress. Appearance: Normal appearance. He is not ill-appearing. HENT:      Head: Normocephalic. Eyes:      Conjunctiva/sclera: Conjunctivae normal.   Neck:      Musculoskeletal: Neck supple. Cardiovascular:      Rate and Rhythm: Normal rate and regular rhythm. Pulmonary:      Effort: Pulmonary effort is normal. No respiratory distress. Musculoskeletal:         General: No swelling. Neurological:      Mental Status: He is alert. Psychiatric:         Mood and Affect: Mood normal.         Judgment: Judgment normal.       Assessment:      1. Essential hypertension    2. Moderate episode of recurrent major depressive disorder ([facility] [address]    3.  Nonrheumatic aortic valve insufficiency             Plan:     Patient Instructions

## 2021-02-08 ENCOUNTER — OFFICE VISIT (OUTPATIENT)
Dept: FAMILY MEDICINE CLINIC | Age: 66
End: 2021-02-08
Payer: COMMERCIAL

## 2021-02-08 VITALS
SYSTOLIC BLOOD PRESSURE: 160 MMHG | OXYGEN SATURATION: 98 % | TEMPERATURE: 97 F | WEIGHT: 188 LBS | BODY MASS INDEX: 26.32 KG/M2 | HEART RATE: 63 BPM | DIASTOLIC BLOOD PRESSURE: 78 MMHG | HEIGHT: 71 IN

## 2021-02-08 DIAGNOSIS — Z13.1 SCREENING FOR DIABETES MELLITUS: ICD-10-CM

## 2021-02-08 DIAGNOSIS — I35.1 NONRHEUMATIC AORTIC VALVE INSUFFICIENCY: ICD-10-CM

## 2021-02-08 DIAGNOSIS — I10 ESSENTIAL HYPERTENSION: Primary | ICD-10-CM

## 2021-02-08 DIAGNOSIS — F33.1 MODERATE EPISODE OF RECURRENT MAJOR DEPRESSIVE DISORDER (HCC): ICD-10-CM

## 2021-02-08 PROCEDURE — 99213 OFFICE O/P EST LOW 20 MIN: CPT | Performed by: FAMILY MEDICINE

## 2021-02-08 RX ORDER — AMLODIPINE BESYLATE 5 MG/1
5 TABLET ORAL DAILY
Qty: 90 TABLET | Refills: 1 | Status: SHIPPED | OUTPATIENT
Start: 2021-02-08 | End: 2021-08-19

## 2021-02-08 SDOH — ECONOMIC STABILITY: FOOD INSECURITY: WITHIN THE PAST 12 MONTHS, THE FOOD YOU BOUGHT JUST DIDN'T LAST AND YOU DIDN'T HAVE MONEY TO GET MORE.: NEVER TRUE

## 2021-02-08 ASSESSMENT — ENCOUNTER SYMPTOMS: SHORTNESS OF BREATH: 1

## 2021-02-08 NOTE — PROGRESS NOTES
[address] [facility]  Dept: [phone]  Dept Fax:[phone]    Joelle Nixon is a 60 y.o. male who presents today for his medical conditions/complaints as noted below. Joelle Nixon is c/o of Hypertension and Depression      HPI:     HPI  Here for follow up of depression, aortic valve issue and HTN  Taking all medications regularly  Exercising regularly  No side effects noted    No other complaint currently    BP Readings from Last 3 Encounters:   21 (!) 160/78   20 130/84   20 (!) 150/74          (goal 120/80)    Past Medical History:   Diagnosis Date    Aortic regurgitation 2019    moderate to severe per ECHO    Bronchospasm     Depression     Dyspnea       Past Surgical History:   Procedure Laterality Date    HERNIA REPAIR      TONSILLECTOMY         Family History   Problem Relation Age of Onset    High Blood Pressure Mother     Other Mother         dementia    Coronary Art Dis Father     Stroke Father     Cancer Father 80        complications of colon cancer    Stroke Maternal Grandmother        Social History     Tobacco Use    Smoking status: Former Smoker     Packs/day: 1.00     Years: 10.00     Pack years: 10.00     Types: Cigarettes     Quit date: 2019     Years since quittin.6    Smokeless tobacco: Former User   Substance Use Topics    Alcohol use: Yes     Comment: couple beers per week      Current Outpatient Medications   Medication Sig Dispense Refill    amLODIPine (NORVASC) 5 MG tablet Take 1 tablet by mouth daily 90 tablet 1    buPROPion (WELLBUTRIN SR) 200 MG extended release tablet TAKE 1 TABLET BY MOUTH TWICE DAILY 60 tablet 5    traZODone (DESYREL) 50 MG tablet TAKE 1 TABLET BY MOUTH EVERY NIGHT AS NEEDED FOR SLEEP 90 tablet 1     No current facility-administered medications for this visit.       Allergies   Allergen Reactions    Vicodin [Hydrocodone-Acetaminophen] Itching Health Maintenance   Topic Date Due    HIV screen  07/31/1971    Diabetes screen  07/31/1996    Colon cancer screen colonoscopy  07/31/2006    Potassium monitoring  02/11/2021    Creatinine monitoring  02/11/2021    Lipid screen  08/23/2021    DTaP/Tdap/Td vaccine (2 - Td) 08/28/2030    Flu vaccine  Completed    Shingles Vaccine  Completed    Pneumococcal 0-64 years Vaccine  Completed    Hepatitis C screen  Completed    Hepatitis A vaccine  Aged Out    Hepatitis B vaccine  Aged Out    Hib vaccine  Aged Out    Meningococcal (ACWY) vaccine  Aged Out       Subjective:      Review of Systems   Constitutional: Positive for fatigue. Eyes: Negative for visual disturbance. Respiratory: Positive for shortness of breath (with exertion ). Cardiovascular: Negative for chest pain, palpitations and leg swelling. Genitourinary: Negative for frequency. Neurological: Negative for dizziness. Psychiatric/Behavioral: Positive for sleep disturbance (having trouble falling asleep and staying asleep. ). Negative for agitation. The patient is not nervous/anxious. Home BP Checks?no  Medication Compliant? yes    Objective:     BP (!) 160/78 (Site: Right Upper Arm, Position: Sitting, Cuff Size: Large Adult)   Pulse 63   Temp 97 °F (36.1 °C) (Temporal)   Ht 5' 11\" (1.803 m)   Wt 188 lb (85.3 kg)   SpO2 98%   BMI 26.22 kg/m²   Physical Exam  Vitals signs reviewed. Constitutional:       General: He is not in acute distress. Appearance: Normal appearance. He is not ill-appearing or toxic-appearing. Comments: Improved muscle tone noted throughout   HENT:      Head: Normocephalic. Eyes:      Conjunctiva/sclera: Conjunctivae normal.   Neck:      Musculoskeletal: Neck supple. Vascular: No carotid bruit. Cardiovascular:      Rate and Rhythm: Normal rate and regular rhythm. Pulmonary:      Effort: Pulmonary effort is normal. No respiratory distress.    Abdominal: General: Bowel sounds are normal.   Musculoskeletal:         General: No swelling. Lymphadenopathy:      Cervical: No cervical adenopathy. Neurological:      Mental Status: He is alert. Psychiatric:         Thought Content: Thought content normal.         Judgment: Judgment normal.         Lab Results   Component Value Date     02/11/2020    K 4.3 02/11/2020     02/11/2020    CO2 27 02/11/2020     Lab Results   Component Value Date    CREATININE 1.3 02/11/2020       Assessment:      1. Essential hypertension    2. Moderate episode of recurrent major depressive disorder ([facility] [address]    3. Nonrheumatic aortic valve insufficiency    4. Screening for diabetes mellitus           Plan:     Patient Instructions   covid vaccine reviewed. May get sooner with known heart condition. Orders Placed This Encounter   Procedures    Creatinine, Serum     Standing Status:   Future     Standing Expiration Date:   2/8/2022    Electrolyte Panel     Standing Status:   Future     Standing Expiration Date:   2/8/2022    Glucose, Fasting     Standing Status:   Future     Standing Expiration Date:   2/8/2022     Orders Placed This Encounter   Medications    amLODIPine (NORVASC) 5 MG tablet     Sig: Take 1 tablet by mouth daily     Dispense:  90 tablet     Refill:  1     Please cancel 2.5 mg dose        Return in about 6 months (around 8/8/2021) for HTN. Discussed use, benefit, and side effects of prescribed medications. All patient questions answered. Pt voiced understanding. Reviewed health maintenance lab and diabetes prior to follow. Instructed to continue current medications, diet and exercise. Patient agreed with treatment plan. Follow up as directed.      Electronically signedby Alayna Barney MD on 2/8/2021

## 2021-03-08 DIAGNOSIS — F33.1 MODERATE EPISODE OF RECURRENT MAJOR DEPRESSIVE DISORDER (HCC): ICD-10-CM

## 2021-03-08 RX ORDER — BUPROPION HYDROCHLORIDE 200 MG/1
TABLET, EXTENDED RELEASE ORAL
Qty: 60 TABLET | Refills: 5 | Status: SHIPPED | OUTPATIENT
Start: 2021-03-08 | End: 2021-12-28 | Stop reason: SDUPTHER

## 2021-03-08 NOTE — TELEPHONE ENCOUNTER
Lio Boyce is requesting a refill on the following medication(s):  Requested Prescriptions     Pending Prescriptions Disp Refills    buPROPion (WELLBUTRIN SR) 200 MG extended release tablet 60 tablet 5     Sig: TAKE 1 TABLET BY MOUTH TWICE DAILY       Last Visit Date (If Applicable):  9/8/1821    Next Visit Date:    8/10/2021

## 2021-05-22 DIAGNOSIS — F33.1 MODERATE EPISODE OF RECURRENT MAJOR DEPRESSIVE DISORDER (HCC): ICD-10-CM

## 2021-05-22 NOTE — TELEPHONE ENCOUNTER
Marquise Norris is requesting a refill on the following medication(s):  Requested Prescriptions     Pending Prescriptions Disp Refills    traZODone (DESYREL) 50 MG tablet [Pharmacy Med Name: TRAZODONE 50MG TABLETS] 90 tablet 1     Sig: TAKE 1 TABLET BY MOUTH EVERY NIGHT AS NEEDED FOR SLEEP       Last Visit Date (If Applicable):  0/4/4752    Next Visit Date:    8/10/2021

## 2021-05-23 RX ORDER — TRAZODONE HYDROCHLORIDE 50 MG/1
TABLET ORAL
Qty: 90 TABLET | Refills: 1 | Status: SHIPPED | OUTPATIENT
Start: 2021-05-23 | End: 2021-12-28

## 2021-08-11 ENCOUNTER — OFFICE VISIT (OUTPATIENT)
Dept: FAMILY MEDICINE CLINIC | Age: 66
End: 2021-08-11
Payer: MEDICARE

## 2021-08-11 VITALS
HEART RATE: 58 BPM | BODY MASS INDEX: 26.14 KG/M2 | HEIGHT: 70 IN | WEIGHT: 182.6 LBS | DIASTOLIC BLOOD PRESSURE: 80 MMHG | TEMPERATURE: 97.3 F | OXYGEN SATURATION: 97 % | SYSTOLIC BLOOD PRESSURE: 140 MMHG

## 2021-08-11 DIAGNOSIS — I10 ESSENTIAL HYPERTENSION: ICD-10-CM

## 2021-08-11 DIAGNOSIS — F33.1 MODERATE EPISODE OF RECURRENT MAJOR DEPRESSIVE DISORDER (HCC): ICD-10-CM

## 2021-08-11 DIAGNOSIS — H00.014 HORDEOLUM EXTERNUM OF LEFT UPPER EYELID: Primary | ICD-10-CM

## 2021-08-11 PROCEDURE — 99214 OFFICE O/P EST MOD 30 MIN: CPT | Performed by: FAMILY MEDICINE

## 2021-08-11 PROCEDURE — 99212 OFFICE O/P EST SF 10 MIN: CPT

## 2021-08-11 ASSESSMENT — ENCOUNTER SYMPTOMS
EYE ITCHING: 1
PHOTOPHOBIA: 0
EYE PAIN: 1
EYE REDNESS: 0
EYE DISCHARGE: 1

## 2021-08-11 NOTE — PROGRESS NOTES
[address] [address]  Dept: [phone]  Dept Fax: [phone]    Olamide Sibley is a 73 y.o. male who presents today for his medical conditions/complaints as noted below. Olamide Sibley c/o of Stye (x1 week, getting worse. )      HPI:     HPI  Pt here for evaluation of spot on left eye getting worse past week. Awoke with lid swollen last week, has gotten harder. Itching and burning. No trauma noted. No fevers or similar issues prior. Using wellbutrin regularly, not using trazodone often. Taking amlodipine well. Mood doing well. BP Readings from Last 3 Encounters:   21 (!) 140/80   21 (!) 160/78   20 130/84          (goal 120/80)    Past Medical History:   Diagnosis Date    Aortic regurgitation 2019    moderate to severe per ECHO    Bronchospasm     Depression     Dyspnea       Past Surgical History:   Procedure Laterality Date    HERNIA REPAIR      TONSILLECTOMY         Family History   Problem Relation Age of Onset    High Blood Pressure Mother     Other Mother         dementia    Coronary Art Dis Father     Stroke Father     Cancer Father 80        complications of colon cancer    Stroke Maternal Grandmother        Social History     Tobacco Use    Smoking status: Former Smoker     Packs/day: 1.00     Years: 10.00     Pack years: 10.00     Types: Cigarettes     Quit date: 2019     Years since quittin.1    Smokeless tobacco: Former User   Substance Use Topics    Alcohol use: Yes     Comment: couple beers per week      Prior to Visit Medications    Medication Sig Taking?  Authorizing Provider   traZODone (DESYREL) 50 MG tablet TAKE 1 TABLET BY MOUTH EVERY NIGHT AS NEEDED FOR SLEEP  Rubi Platt MD   buPROPion (WELLBUTRIN SR) 200 MG extended release tablet TAKE 1 TABLET BY MOUTH TWICE DAILY  Rubi Platt MD   amLODIPine (NORVASC) 5 MG tablet Take 1 tablet by mouth daily  Rubi Platt MD Allergies   Allergen Reactions    Vicodin [Hydrocodone-Acetaminophen] Itching       Health Maintenance   Topic Date Due    AAA screen  Never done    HIV screen  Never done    Diabetes screen  Never done    Colon cancer screen colonoscopy  Never done    Potassium monitoring  02/11/2021    Creatinine monitoring  02/11/2021    Lipid screen  08/23/2021    Flu vaccine (1) 09/01/2021    Pneumococcal 65+ years Vaccine (1 of 1 - PPSV23) 06/26/2024    DTaP/Tdap/Td vaccine (2 - Td or Tdap) 08/28/2030    Shingles Vaccine  Completed    COVID-19 Vaccine  Completed    Hepatitis C screen  Completed    Hepatitis A vaccine  Aged Out    Hepatitis B vaccine  Aged Out    Hib vaccine  Aged Out    Meningococcal (ACWY) vaccine  Aged Out       Subjective:      Review of Systems   Eyes: Positive for pain, discharge, itching and visual disturbance (in line of site). Negative for photophobia and redness. Objective:     BP (!) 140/80 (Site: Left Upper Arm, Position: Sitting, Cuff Size: Medium Adult)   Pulse 58   Temp 97.3 °F (36.3 °C) (Temporal)   Ht 5' 10\" (1.778 m)   Wt 182 lb 9.6 oz (82.8 kg)   SpO2 97%   BMI 26.20 kg/m²     Physical Exam  HENT:      Head: Normocephalic. Eyes:      Conjunctiva/sclera: Conjunctivae normal.   Pulmonary:      Effort: Pulmonary effort is normal. No respiratory distress. Musculoskeletal:         General: Swelling and tenderness (slight overlying lesion) present. Neurological:      Mental Status: He is alert. Psychiatric:         Behavior: Behavior normal.         Thought Content: Thought content normal.         Judgment: Judgment normal.         3 x 5 mm hordeolum    Assessment:     1. Hordeolum externum of left upper eyelid    2. Essential hypertension    3. Moderate episode of recurrent major depressive disorder ([facility] [address]      No results found for this visit on 08/11/21. Plan:   No orders of the defined types were placed in this encounter.         Return in about 4 months (around 12/11/2021) for push back 8/19/21 appt for HTN, . There are no Patient Instructions on file for this visit. Discussed use, benefit, and side effects of prescribed medications. All patient questions answered. Pt voiced understanding. Patient agreed with treatment plan. Follow up as directed.      Electronically signed by Mook Palacios MD on 8/11/2021

## 2021-08-16 ENCOUNTER — OFFICE VISIT (OUTPATIENT)
Dept: OPTOMETRY | Age: 66
End: 2021-08-16
Payer: MEDICARE

## 2021-08-16 DIAGNOSIS — H00.011 HORDEOLUM EXTERNUM OF RIGHT UPPER EYELID: Primary | ICD-10-CM

## 2021-08-16 PROCEDURE — 99204 OFFICE O/P NEW MOD 45 MIN: CPT | Performed by: OPTOMETRIST

## 2021-08-16 RX ORDER — MINOCYCLINE HYDROCHLORIDE 100 MG/1
100 CAPSULE ORAL 2 TIMES DAILY
Qty: 28 CAPSULE | Refills: 2 | Status: SHIPPED | OUTPATIENT
Start: 2021-08-16 | End: 2021-08-30

## 2021-08-16 ASSESSMENT — ENCOUNTER SYMPTOMS
GASTROINTESTINAL NEGATIVE: 0
EYES NEGATIVE: 0
ALLERGIC/IMMUNOLOGIC NEGATIVE: 0
RESPIRATORY NEGATIVE: 0

## 2021-08-16 ASSESSMENT — TONOMETRY
OD_IOP_MMHG: 14
IOP_METHOD: NON-CONTACT AIR PUFF
OS_IOP_MMHG: 13

## 2021-08-16 ASSESSMENT — VISUAL ACUITY
OS_CC: 20/25
OS_CC+: -2
METHOD: SNELLEN - LINEAR
CORRECTION_TYPE: GLASSES

## 2021-08-16 NOTE — PROGRESS NOTES
Mirian Briseno presents today for   Chief Complaint   Patient presents with   Maher Door   . HPI     Stye Right Upper Eyelid x 2 weeks  First noticed 8/3/2021 ; saw Dr. Sonam Flowers on 2021 no medication was prescribed. Has been using warm compresses, is tender to the touch. Is not as swollen and inflammed as it was on   Had a stye 5-6 years ago but on the bottom lid          Current Outpatient Medications   Medication Sig Dispense Refill    minocycline (MINOCIN;DYNACIN) 100 MG capsule Take 1 capsule by mouth 2 times daily for 14 days 28 capsule 2    traZODone (DESYREL) 50 MG tablet TAKE 1 TABLET BY MOUTH EVERY NIGHT AS NEEDED FOR SLEEP 90 tablet 1    buPROPion (WELLBUTRIN SR) 200 MG extended release tablet TAKE 1 TABLET BY MOUTH TWICE DAILY 60 tablet 5    amLODIPine (NORVASC) 5 MG tablet Take 1 tablet by mouth daily 90 tablet 1     No current facility-administered medications for this visit.          Family History   Problem Relation Age of Onset    High Blood Pressure Mother     Other Mother         dementia    Coronary Art Dis Father     Stroke Father     Cancer Father 80        complications of colon cancer    Stroke Maternal Grandmother     Diabetes Neg Hx     Cataracts Neg Hx     Glaucoma Neg Hx      Social History     Socioeconomic History    Marital status:      Spouse name: None    Number of children: None    Years of education: None    Highest education level: None   Occupational History    None   Tobacco Use    Smoking status: Former Smoker     Packs/day: 1.00     Years: 10.00     Pack years: 10.00     Types: Cigarettes     Quit date: 2019     Years since quittin.1    Smokeless tobacco: Former User   Vaping Use    Vaping Use: Never assessed   Substance and Sexual Activity    Alcohol use: Yes     Comment: couple beers per week    Drug use: No    Sexual activity: Yes     Partners: Female   Other Topics Concern    None   Social History Narrative    None     Social Determinants of Health     Financial Resource Strain: Low Risk     Difficulty of Paying Living Expenses: Not hard at all   Food Insecurity: No Food Insecurity    Worried About Running Out of Food in the Last Year: Never true    Finn of Food in the Last Year: Never true   Transportation Needs: No Transportation Needs    Lack of Transportation (Medical): No    Lack of Transportation (Non-Medical):  No   Physical Activity:     Days of Exercise per Week:     Minutes of Exercise per Session:    Stress:     Feeling of Stress :    Social Connections:     Frequency of Communication with Friends and Family:     Frequency of Social Gatherings with Friends and Family:     Attends Zoroastrianism Services:     Active Member of Clubs or Organizations:     Attends Club or Organization Meetings:     Marital Status:    Intimate Partner Violence:     Fear of Current or Ex-Partner:     Emotionally Abused:     Physically Abused:     Sexually Abused:      Past Medical History:   Diagnosis Date    Aortic regurgitation 03/2019    moderate to severe per ECHO    Bronchospasm     Depression     Dyspnea        ROS     Negative for: Constitutional, Gastrointestinal, Neurological, Skin, Genitourinary, Musculoskeletal, HENT, Endocrine, Cardiovascular, Eyes, Respiratory, Psychiatric, Allergic/Imm, Heme/Lymph          Main Ophthalmology Exam     Slit Lamp Exam       Right Left    Lids/Lashes RUL hordeolum;  just under the skin;  no head on surface                     Tonometry     Tonometry (Non-contact air puff, 2:18 PM)       Right Left    Pressure 14 13   IOPg:  10.7             9.5  CH:  8.8          8.2  WS: 8.4          5.4                  Not recorded         Not recorded          Visual Acuity (Snellen - Linear)       Right Left    Dist cc 20/25 20/25 -2    Correction: Glasses           Not recorded         Not recorded         Not recorded             Not recorded         Not recorded               No orders of the defined types were placed in this encounter. IMPRESSION:  1. Hordeolum externum of right upper eyelid        PLAN:    1. Use medication as prescribed     Minocycline 100 BID for 14 days and then 14 more days if needed    Also smear the erythrmycin ointment to the upper lid 3x per day    Warm compress 3x per day       Patient Instructions   Use medication as prescribed  Minocycline 100 BID for 14 days and then 14 more days if needed    Also smear the erythrmycin ointment to the upper lid 3x per day    Warm compress 3x per day      Return if symptoms worsen or fail to improve.

## 2021-08-16 NOTE — PATIENT INSTRUCTIONS
Use medication as prescribed  Minocycline 100 BID for 14 days and then 14 more days if needed    Also smear the erythrmycin ointment to the upper lid 3x per day    Warm compress 3x per day

## 2021-08-19 DIAGNOSIS — I10 ESSENTIAL HYPERTENSION: ICD-10-CM

## 2021-08-19 RX ORDER — AMLODIPINE BESYLATE 5 MG/1
5 TABLET ORAL DAILY
Qty: 90 TABLET | Refills: 1 | Status: SHIPPED | OUTPATIENT
Start: 2021-08-19 | End: 2021-12-28 | Stop reason: SDUPTHER

## 2021-12-28 ENCOUNTER — OFFICE VISIT (OUTPATIENT)
Dept: FAMILY MEDICINE CLINIC | Age: 66
End: 2021-12-28
Payer: MEDICARE

## 2021-12-28 VITALS
WEIGHT: 178 LBS | HEIGHT: 70 IN | HEART RATE: 67 BPM | OXYGEN SATURATION: 98 % | SYSTOLIC BLOOD PRESSURE: 160 MMHG | DIASTOLIC BLOOD PRESSURE: 86 MMHG | BODY MASS INDEX: 25.48 KG/M2

## 2021-12-28 DIAGNOSIS — F33.1 MODERATE EPISODE OF RECURRENT MAJOR DEPRESSIVE DISORDER (HCC): ICD-10-CM

## 2021-12-28 DIAGNOSIS — Z87.891 FORMER SMOKER: ICD-10-CM

## 2021-12-28 DIAGNOSIS — I10 ESSENTIAL HYPERTENSION: Primary | ICD-10-CM

## 2021-12-28 DIAGNOSIS — I35.1 NONRHEUMATIC AORTIC VALVE INSUFFICIENCY: ICD-10-CM

## 2021-12-28 PROCEDURE — 99213 OFFICE O/P EST LOW 20 MIN: CPT | Performed by: INTERNAL MEDICINE

## 2021-12-28 PROCEDURE — 99214 OFFICE O/P EST MOD 30 MIN: CPT | Performed by: INTERNAL MEDICINE

## 2021-12-28 RX ORDER — AMLODIPINE BESYLATE 5 MG/1
5 TABLET ORAL DAILY
Qty: 90 TABLET | Refills: 3 | Status: SHIPPED | OUTPATIENT
Start: 2021-12-28

## 2021-12-28 RX ORDER — BUPROPION HYDROCHLORIDE 200 MG/1
TABLET, EXTENDED RELEASE ORAL
Qty: 60 TABLET | Refills: 5 | Status: SHIPPED | OUTPATIENT
Start: 2021-12-28

## 2021-12-28 ASSESSMENT — ENCOUNTER SYMPTOMS
SINUS PAIN: 0
COUGH: 0
SORE THROAT: 0
ABDOMINAL PAIN: 0
BLOOD IN STOOL: 0
DIARRHEA: 0
SHORTNESS OF BREATH: 0
TROUBLE SWALLOWING: 0
CHEST TIGHTNESS: 0
RHINORRHEA: 0

## 2021-12-28 NOTE — PROGRESS NOTES
[facility]  [address] [address]  Dept: [phone]  Dept Fax: [phone]  Loc: [phone]     Visit Date:  12/28/2021    Patient:  Miah Momin  YOB: 1955    HPI:   Miah Momin presents today for   Chief Complaint   Patient presents with    New Patient    Hypertension   . HPI 73year old male is coming in to establish with us. HTN-On amlodipine 5 mg. /86 with HR 67. Inconsistent ranges at home with 2 bp cuff machine. No cardiopulmonary related symptoms. Aortic Insufficiency- Exertional shortness of breath. Adjusted lifestyle takes longer breaks. Taking stairs is an ordeal without getting shortness of breath. RENEE done 5 years ago it was not at that point where it needs replacement. Depression/Anxiety- On Wellbutyrin  mg BID. Stable. Etoh-couple of beers sporadically. Heavy drinking in the past.    Former smoker-quit 2 years ago. Started 18 years 1 .5 ppd >30 years, smoking cigars . Medications  Prior to Visit Medications    Medication Sig Taking? Authorizing Provider   amLODIPine (NORVASC) 5 MG tablet TAKE 1 TABLET BY MOUTH DAILY Yes Reyna Chandler MD   buPROPion (WELLBUTRIN SR) 200 MG extended release tablet TAKE 1 Charley Gosselin Yes Reyna Chandler MD   traZODone (DESYREL) 50 MG tablet TAKE 1 TABLET BY MOUTH EVERY NIGHT AS NEEDED FOR SLEEP  Patient not taking: Reported on 12/28/2021  Reyna Chandler MD        Allergies:  is allergic to vicodin [hydrocodone-acetaminophen]. Past Medical History:   has a past medical history of Aortic regurgitation, Bronchospasm, Depression, and Dyspnea. Past Surgical History   has a past surgical history that includes hernia repair and Tonsillectomy.     Family History  family history includes Cancer (age of onset: 80) in his father; Coronary Art Dis in his father; High Blood Pressure in his mother; Other in his mother; Stroke in his father and maternal grandmother. Social History   reports that he quit smoking about 2 years ago. His smoking use included cigarettes. He has a 10.00 pack-year smoking history. He has quit using smokeless tobacco. He reports current alcohol use. He reports that he does not use drugs. Health Maintenance:    Health Maintenance   Topic Date Due    AAA screen  Never done    HIV screen  Never done    Diabetes screen  Never done    Colon cancer screen colonoscopy  Never done    Potassium monitoring  02/11/2021    Creatinine monitoring  02/11/2021    Annual Wellness Visit (AWV)  Never done    Lipid screen  08/23/2021    Flu vaccine (1) 09/01/2021    COVID-19 Vaccine (3 - Booster for Moderna series) 09/11/2021    Pneumococcal 65+ years Vaccine (1 of 1 - PPSV23) 06/26/2024    DTaP/Tdap/Td vaccine (2 - Td or Tdap) 08/28/2030    Shingles Vaccine  Completed    Hepatitis C screen  Completed    Hepatitis A vaccine  Aged Out    Hepatitis B vaccine  Aged Out    Hib vaccine  Aged Out    Meningococcal (ACWY) vaccine  Aged Out       Subjective:      Review of Systems   Constitutional: Negative for fatigue, fever and unexpected weight change. HENT: Negative for ear pain, postnasal drip, rhinorrhea, sinus pain, sore throat and trouble swallowing. Eyes: Negative for visual disturbance. Respiratory: Negative for cough, chest tightness and shortness of breath. Cardiovascular: Negative for chest pain and leg swelling. Gastrointestinal: Negative for abdominal pain, blood in stool and diarrhea. Endocrine: Negative for polyuria. Genitourinary: Negative for difficulty urinating and flank pain. Musculoskeletal: Negative for arthralgias, joint swelling and myalgias. Skin: Negative for rash. Allergic/Immunologic: Negative for environmental allergies. Neurological: Negative for weakness, light-headedness, numbness and headaches.    Hematological: Negative for adenopathy. Psychiatric/Behavioral: Negative for behavioral problems and suicidal ideas. The patient is not nervous/anxious. Objective:     BP (!) 160/86 (Site: Right Upper Arm, Position: Sitting, Cuff Size: Medium Adult)   Pulse 67   Ht 5' 10\" (1.778 m)   Wt 178 lb (80.7 kg)   SpO2 98%   BMI 25.54 kg/m²     Physical Exam  Vitals and nursing note reviewed. HENT:      Head: Normocephalic and atraumatic. Cardiovascular:      Rate and Rhythm: Normal rate and regular rhythm. Heart sounds: Murmur heard. Comments: Diastolic blowing murmur heard in 2nd ICS. Pulmonary:      Effort: Pulmonary effort is normal.      Breath sounds: Normal breath sounds. No stridor. Abdominal:      General: Abdomen is flat. Musculoskeletal:         General: Normal range of motion. Right lower leg: No edema. Left lower leg: No edema. Skin:     General: Skin is warm. Neurological:      Mental Status: He is oriented to person, place, and time. Mental status is at baseline. Psychiatric:         Mood and Affect: Mood normal.             Assessment       Diagnosis Orders   1. Essential hypertension  buPROPion (WELLBUTRIN SR) 200 MG extended release tablet    amLODIPine (NORVASC) 5 MG tablet   2. Nonrheumatic aortic valve insufficiency  buPROPion (WELLBUTRIN SR) 200 MG extended release tablet    amLODIPine (NORVASC) 5 MG tablet   3. Moderate episode of recurrent major depressive disorder (HCC)  buPROPion (WELLBUTRIN SR) 200 MG extended release tablet    amLODIPine (NORVASC) 5 MG tablet   4. Former smoker  buPROPion (WELLBUTRIN SR) 200 MG extended release tablet    amLODIPine (NORVASC) 5 MG tablet         PLAN   Discussed AAA/lung cancer screening. Encourage home blood pressure monitoring. No orders of the defined types were placed in this encounter. No follow-ups on file. Patient given educational materials - see patient instructions.   Discussed use, benefit, and side effects of prescribed medications. All patient questions answered. Pt voiced understanding. Reviewed health maintenance.        Electronically signed Neo Teague MD on 12/28/2021 at 3:31 PM EST

## 2022-03-23 NOTE — TELEPHONE ENCOUNTER
From: Josee Pollard  To: Art Norris MD  Sent: 4/15/2019 7:03 AM EDT  Subject: Prescription Question    Dr. Tennille Bella, I remember you said the Trazodone had a flexibility in terms of mg taken, if at all. After a week taking half the 150mg prescribed, I find the effect concerning. Although it does help me sleep better thru the entire night, I am experiencing dizziness as well as a foggy mental functioning well into the latter part of the day. And that's with the half dose. As an experiment, I didn't take any last night. I did not sleep well and awoke several times thru the night. This morning however, I woke up clear minded, alert and stable in my gate. I don't know how to proceed since tonight I am supposed to start both the full dose of Trazodone as well as the 200mg dose of Wellbutrin. Any suggestions? Thank you.   Josee Pollard 84

## 2023-01-23 DIAGNOSIS — I10 ESSENTIAL HYPERTENSION: ICD-10-CM

## 2023-01-23 DIAGNOSIS — Z87.891 FORMER SMOKER: ICD-10-CM

## 2023-01-23 DIAGNOSIS — F33.1 MODERATE EPISODE OF RECURRENT MAJOR DEPRESSIVE DISORDER (HCC): ICD-10-CM

## 2023-01-23 DIAGNOSIS — I35.1 NONRHEUMATIC AORTIC VALVE INSUFFICIENCY: ICD-10-CM

## 2023-01-23 RX ORDER — AMLODIPINE BESYLATE 5 MG/1
5 TABLET ORAL DAILY
Qty: 30 TABLET | Refills: 0 | Status: SHIPPED | OUTPATIENT
Start: 2023-01-23

## 2023-01-23 NOTE — TELEPHONE ENCOUNTER
Left message for patient to call back to schedule appt. It has been over 1 year since seen.        Shawanda James is requesting a refill on the following medication(s):  Requested Prescriptions     Pending Prescriptions Disp Refills    amLODIPine (NORVASC) 5 MG tablet 30 tablet 0     Sig: Take 1 tablet by mouth daily       Last Visit Date (If Applicable):  40/98/5857    Next Visit Date:    Visit date not found

## 2023-06-07 ENCOUNTER — TELEPHONE (OUTPATIENT)
Dept: FAMILY MEDICINE CLINIC | Age: 68
End: 2023-06-07

## 2023-06-07 NOTE — TELEPHONE ENCOUNTER
Outreach call to verify pcp and set appt.  Lvm If to follow with Dr Desirae Kern needs to make appt [phone]

## 2023-06-13 SDOH — HEALTH STABILITY: PHYSICAL HEALTH: ON AVERAGE, HOW MANY DAYS PER WEEK DO YOU ENGAGE IN MODERATE TO STRENUOUS EXERCISE (LIKE A BRISK WALK)?: 2 DAYS

## 2023-06-13 SDOH — HEALTH STABILITY: PHYSICAL HEALTH: ON AVERAGE, HOW MANY MINUTES DO YOU ENGAGE IN EXERCISE AT THIS LEVEL?: 10 MIN

## 2023-06-15 ENCOUNTER — OFFICE VISIT (OUTPATIENT)
Dept: FAMILY MEDICINE CLINIC | Age: 68
End: 2023-06-15
Payer: MEDICARE

## 2023-06-15 VITALS
DIASTOLIC BLOOD PRESSURE: 100 MMHG | WEIGHT: 168 LBS | HEIGHT: 70 IN | SYSTOLIC BLOOD PRESSURE: 172 MMHG | HEART RATE: 64 BPM | BODY MASS INDEX: 24.05 KG/M2 | OXYGEN SATURATION: 97 %

## 2023-06-15 DIAGNOSIS — I35.1 NONRHEUMATIC AORTIC VALVE INSUFFICIENCY: Primary | ICD-10-CM

## 2023-06-15 DIAGNOSIS — Z13.220 ENCOUNTER FOR LIPID SCREENING FOR CARDIOVASCULAR DISEASE: ICD-10-CM

## 2023-06-15 DIAGNOSIS — I10 ESSENTIAL HYPERTENSION: ICD-10-CM

## 2023-06-15 DIAGNOSIS — F33.1 MODERATE EPISODE OF RECURRENT MAJOR DEPRESSIVE DISORDER (HCC): ICD-10-CM

## 2023-06-15 DIAGNOSIS — E78.00 HYPERCHOLESTEREMIA: ICD-10-CM

## 2023-06-15 DIAGNOSIS — Z13.6 ENCOUNTER FOR LIPID SCREENING FOR CARDIOVASCULAR DISEASE: ICD-10-CM

## 2023-06-15 PROCEDURE — 1123F ACP DISCUSS/DSCN MKR DOCD: CPT

## 2023-06-15 PROCEDURE — 99212 OFFICE O/P EST SF 10 MIN: CPT

## 2023-06-15 PROCEDURE — 99204 OFFICE O/P NEW MOD 45 MIN: CPT

## 2023-06-15 PROCEDURE — 3074F SYST BP LT 130 MM HG: CPT

## 2023-06-15 PROCEDURE — 3078F DIAST BP <80 MM HG: CPT

## 2023-06-15 RX ORDER — AMLODIPINE BESYLATE 5 MG/1
5 TABLET ORAL DAILY
Qty: 90 TABLET | Refills: 0
Start: 2023-06-15

## 2023-06-15 SDOH — ECONOMIC STABILITY: HOUSING INSECURITY
IN THE LAST 12 MONTHS, WAS THERE A TIME WHEN YOU DID NOT HAVE A STEADY PLACE TO SLEEP OR SLEPT IN A SHELTER (INCLUDING NOW)?: NO

## 2023-06-15 SDOH — ECONOMIC STABILITY: FOOD INSECURITY: WITHIN THE PAST 12 MONTHS, THE FOOD YOU BOUGHT JUST DIDN'T LAST AND YOU DIDN'T HAVE MONEY TO GET MORE.: NEVER TRUE

## 2023-06-15 SDOH — ECONOMIC STABILITY: FOOD INSECURITY: WITHIN THE PAST 12 MONTHS, YOU WORRIED THAT YOUR FOOD WOULD RUN OUT BEFORE YOU GOT MONEY TO BUY MORE.: NEVER TRUE

## 2023-06-15 SDOH — ECONOMIC STABILITY: INCOME INSECURITY: HOW HARD IS IT FOR YOU TO PAY FOR THE VERY BASICS LIKE FOOD, HOUSING, MEDICAL CARE, AND HEATING?: NOT HARD AT ALL

## 2023-06-15 ASSESSMENT — PATIENT HEALTH QUESTIONNAIRE - PHQ9
3. TROUBLE FALLING OR STAYING ASLEEP: 2
SUM OF ALL RESPONSES TO PHQ9 QUESTIONS 1 & 2: 6
6. FEELING BAD ABOUT YOURSELF - OR THAT YOU ARE A FAILURE OR HAVE LET YOURSELF OR YOUR FAMILY DOWN: 2
4. FEELING TIRED OR HAVING LITTLE ENERGY: 3
8. MOVING OR SPEAKING SO SLOWLY THAT OTHER PEOPLE COULD HAVE NOTICED. OR THE OPPOSITE, BEING SO FIGETY OR RESTLESS THAT YOU HAVE BEEN MOVING AROUND A LOT MORE THAN USUAL: 0
2. FEELING DOWN, DEPRESSED OR HOPELESS: 3
SUM OF ALL RESPONSES TO PHQ QUESTIONS 1-9: 15
5. POOR APPETITE OR OVEREATING: 0
1. LITTLE INTEREST OR PLEASURE IN DOING THINGS: 3
7. TROUBLE CONCENTRATING ON THINGS, SUCH AS READING THE NEWSPAPER OR WATCHING TELEVISION: 2
SUM OF ALL RESPONSES TO PHQ QUESTIONS 1-9: 15
9. THOUGHTS THAT YOU WOULD BE BETTER OFF DEAD, OR OF HURTING YOURSELF: 0
10. IF YOU CHECKED OFF ANY PROBLEMS, HOW DIFFICULT HAVE THESE PROBLEMS MADE IT FOR YOU TO DO YOUR WORK, TAKE CARE OF THINGS AT HOME, OR GET ALONG WITH OTHER PEOPLE: 2

## 2023-06-16 LAB
ALBUMIN/GLOBULIN RATIO: 1.4 G/DL
ALBUMIN: 4.1 G/DL (ref 3.5–5)
ALP BLD-CCNC: 61 UNITS/L (ref 38–126)
ALT SERPL-CCNC: 27 UNITS/L (ref 4–50)
ANION GAP SERPL CALCULATED.3IONS-SCNC: 4 MMOL/L
AST SERPL-CCNC: 30 UNITS/L (ref 17–59)
BASOPHILS %: 1.97 (ref 0–3)
BASOPHILS ABSOLUTE: 0.08 (ref 0–0.3)
BILIRUB SERPL-MCNC: 0.7 MG/DL (ref 0.2–1.3)
BUN BLDV-MCNC: 8 MG/DL (ref 9–20)
CALCIUM SERPL-MCNC: 9.4 MG/DL (ref 8.4–10.2)
CHLORIDE BLD-SCNC: 107 MMOL/L (ref 98–120)
CHOLESTEROL/HDL RATIO: 2.6 RATIO (ref 0–4.5)
CHOLESTEROL: 221 MG/DL (ref 50–200)
CO2: 28 MMOL/L (ref 22–31)
CREAT SERPL-MCNC: 0.9 MG/DL (ref 0.7–1.3)
EOSINOPHILS %: 4.46 (ref 0–10)
EOSINOPHILS ABSOLUTE: 0.18 (ref 0–1.1)
GFR CALCULATED: > 60
GLOBULIN: 3 G/DL
GLUCOSE: 95 MG/DL (ref 75–110)
HCT VFR BLD CALC: 45.7 % (ref 42–52)
HDLC SERPL-MCNC: 85 MG/DL (ref 36–68)
HEMOGLOBIN: 14.4 (ref 13.8–17.8)
LDL CHOLESTEROL CALCULATED: 122.8 MG/DL (ref 0–160)
LYMPHOCYTE %: 36.27 (ref 20–51.1)
LYMPHOCYTES ABSOLUTE: 1.44 (ref 1–5.5)
MCH RBC QN AUTO: 28.7 PG (ref 28.5–32.5)
MCHC RBC AUTO-ENTMCNC: 31.5 G/DL (ref 32–37)
MCV RBC AUTO: 91.1 FL (ref 80–94)
MONOCYTES %: 14.83 (ref 1.7–9.3)
MONOCYTES ABSOLUTE: 0.59 (ref 0.1–1)
NEUTROPHILS %: 42.47 (ref 42.2–75.2)
NEUTROPHILS ABSOLUTE: 1.68 (ref 2–8.1)
PDW BLD-RTO: 14.4 % (ref 10–15.5)
PLATELET # BLD: 283.4 THOU/MM3 (ref 130–400)
POTASSIUM SERPL-SCNC: 4.2 MMOL/L (ref 3.6–5)
RBC: 5.01 M/UL (ref 4.7–6.1)
SODIUM BLD-SCNC: 139 MMOL/L (ref 135–145)
TOTAL PROTEIN, SERUM: 7.2 G/DL (ref 6.3–8.2)
TRIGL SERPL-MCNC: 66 MG/DL (ref 10–250)
VLDLC SERPL CALC-MCNC: 13 MG/DL (ref 0–50)
WBC: 4 THOU/ML3 (ref 4.8–10.8)

## 2023-06-19 ENCOUNTER — TELEPHONE (OUTPATIENT)
Dept: FAMILY MEDICINE CLINIC | Age: 68
End: 2023-06-19

## 2023-06-19 RX ORDER — ROSUVASTATIN CALCIUM 10 MG/1
10 TABLET, COATED ORAL NIGHTLY
Qty: 90 TABLET | Refills: 0 | Status: SHIPPED | OUTPATIENT
Start: 2023-06-19

## 2023-06-19 ASSESSMENT — ENCOUNTER SYMPTOMS
CONSTIPATION: 0
RHINORRHEA: 0
ABDOMINAL PAIN: 0
SINUS PAIN: 0
EYE ITCHING: 0
DIARRHEA: 0
WHEEZING: 0
EYE DISCHARGE: 0
COLOR CHANGE: 0
SHORTNESS OF BREATH: 0
BACK PAIN: 0
NAUSEA: 0
SINUS PRESSURE: 0
CHEST TIGHTNESS: 0
COUGH: 0

## 2023-06-19 NOTE — ASSESSMENT & PLAN NOTE
At goal, continue current medications and continue current treatment plan continue Norvasc 5 mg tablets 1 tablet by mouth daily. Blood pressures in goal range, encouraged to monitor blood pressure daily and report these to the office.

## 2023-06-19 NOTE — ASSESSMENT & PLAN NOTE
Has utilized SNRIs in the past with some efficacy. Would like to utilize counseling to see if this is more helpful. Referral placed. Consent: The patient's consent was obtained including but not limited to risks of crusting, scabbing, blistering, scarring, darker or lighter pigmentary change, recurrence, incomplete removal and infection. The patient understands that the procedure is cosmetic in nature and is not covered by insurance. Post-Care Instructions: I reviewed with the patient in detail post-care instructions. Patient is to wear sunprotection, and avoid picking at any of the treated lesions. Pt may apply Vaseline to crusted or scabbing areas. Detail Level: Simple Price (Use Numbers Only, No Special Characters Or $): 0 Render Post-Care Instructions In Note?: no

## 2023-07-25 ENCOUNTER — OFFICE VISIT (OUTPATIENT)
Dept: FAMILY MEDICINE CLINIC | Age: 68
End: 2023-07-25
Payer: MEDICARE

## 2023-07-25 VITALS
SYSTOLIC BLOOD PRESSURE: 188 MMHG | HEIGHT: 70 IN | OXYGEN SATURATION: 97 % | WEIGHT: 167 LBS | DIASTOLIC BLOOD PRESSURE: 98 MMHG | BODY MASS INDEX: 23.91 KG/M2 | HEART RATE: 65 BPM

## 2023-07-25 DIAGNOSIS — I10 ESSENTIAL HYPERTENSION: ICD-10-CM

## 2023-07-25 DIAGNOSIS — I10 ESSENTIAL HYPERTENSION: Primary | ICD-10-CM

## 2023-07-25 PROBLEM — K59.1 FUNCTIONAL DIARRHEA: Status: RESOLVED | Noted: 2017-08-08 | Resolved: 2023-07-25

## 2023-07-25 PROCEDURE — 3080F DIAST BP >= 90 MM HG: CPT

## 2023-07-25 PROCEDURE — 1123F ACP DISCUSS/DSCN MKR DOCD: CPT

## 2023-07-25 PROCEDURE — 99214 OFFICE O/P EST MOD 30 MIN: CPT

## 2023-07-25 PROCEDURE — 99212 OFFICE O/P EST SF 10 MIN: CPT

## 2023-07-25 PROCEDURE — 3077F SYST BP >= 140 MM HG: CPT

## 2023-07-25 RX ORDER — HYDROCHLOROTHIAZIDE 25 MG/1
25 TABLET ORAL EVERY MORNING
Qty: 90 TABLET | OUTPATIENT
Start: 2023-07-25

## 2023-07-25 RX ORDER — HYDROCHLOROTHIAZIDE 25 MG/1
25 TABLET ORAL EVERY MORNING
Qty: 30 TABLET | Refills: 5 | Status: SHIPPED | OUTPATIENT
Start: 2023-07-25 | End: 2023-07-25

## 2023-07-25 RX ORDER — HYDROCHLOROTHIAZIDE 25 MG/1
25 TABLET ORAL EVERY MORNING
Qty: 90 TABLET | Refills: 1 | Status: SHIPPED | OUTPATIENT
Start: 2023-07-25

## 2023-07-25 RX ORDER — LISINOPRIL 20 MG/1
TABLET ORAL
COMMUNITY
Start: 2023-07-03

## 2023-07-25 ASSESSMENT — ENCOUNTER SYMPTOMS
EYE DISCHARGE: 0
RHINORRHEA: 0
CONSTIPATION: 0
SINUS PAIN: 0
NAUSEA: 0
SINUS PRESSURE: 0
WHEEZING: 0
DIARRHEA: 0
SHORTNESS OF BREATH: 1
ABDOMINAL PAIN: 0
EYE ITCHING: 0
BACK PAIN: 0
CHEST TIGHTNESS: 0
COUGH: 0
COLOR CHANGE: 0

## 2023-07-25 NOTE — PROGRESS NOTES
Ran Villar (:  1955) is a 78 y.o. male,Established patient, here for evaluation of the following chief complaint(s):  Hypertension (Pt is here for a 6 week f/u. He saw Dr Edd Cottrell 23. They did dx him having bicuspid valve. Pt is feeling fatigue. )         ASSESSMENT/PLAN:  1. Essential hypertension  Assessment & Plan:   Uncontrolled, changes made today: add HCTZ 25mg once a day. SE of this medication discussed at this time. Continue checking BP daily.   -Has appointment Friday to review echocardiogram with cardiology. Does have quite a bit of dyspnea on exertion, this is have been ongoing for him for multiple years. Worsening over the last year or so. Return in about 3 months (around 10/25/2023), or if symptoms worsen or fail to improve. Subjective   SUBJECTIVE/OBJECTIVE:  Hypertension  This is a chronic problem. The current episode started more than 1 year ago. The problem is unchanged. The problem is uncontrolled. Associated symptoms include shortness of breath. Pertinent negatives include no chest pain, headaches, palpitations or peripheral edema. There are no associated agents to hypertension. Past treatments include ACE inhibitors and calcium channel blockers. The current treatment provides mild improvement. Review of Systems   Constitutional:  Positive for fatigue. Negative for chills, fever and unexpected weight change. HENT:  Negative for congestion, ear pain, rhinorrhea, sinus pressure and sinus pain. Eyes:  Negative for discharge, itching and visual disturbance. Respiratory:  Positive for shortness of breath. Negative for cough, chest tightness and wheezing. Cardiovascular:  Negative for chest pain, palpitations and leg swelling. Gastrointestinal:  Negative for abdominal pain, constipation, diarrhea and nausea. Endocrine: Negative for cold intolerance, heat intolerance, polydipsia and polyphagia. Genitourinary:  Negative for dysuria, flank pain and frequency.

## 2023-07-25 NOTE — ASSESSMENT & PLAN NOTE
Uncontrolled, changes made today: add HCTZ 25mg once a day. SE of this medication discussed at this time. Continue checking BP daily.

## 2023-07-28 ENCOUNTER — TELEPHONE (OUTPATIENT)
Dept: FAMILY MEDICINE CLINIC | Age: 68
End: 2023-07-28

## 2023-07-28 DIAGNOSIS — I35.1 NONRHEUMATIC AORTIC VALVE INSUFFICIENCY: Primary | ICD-10-CM

## 2023-07-28 DIAGNOSIS — I10 ESSENTIAL HYPERTENSION: ICD-10-CM

## 2023-07-28 DIAGNOSIS — E78.00 HYPERCHOLESTEREMIA: ICD-10-CM

## 2023-07-28 NOTE — TELEPHONE ENCOUNTER
Patient stopped in. Had cardio appointment with Dr. Ceasar Mota today. He states Dr. Ceasar Mota wanted him to \"double up\" on all of his medications. He doesn't really feel that Dr. Ceasar Mota is the \"doctor for him. \"     He would like to speak with you via phone if/when you have time next week.

## 2023-07-31 NOTE — TELEPHONE ENCOUNTER
Patient was informed of the medication dosing, as well as the new cardiology referral.     He verbalized understanding and agreed with plan.

## 2023-07-31 NOTE — TELEPHONE ENCOUNTER
Lelia Fletcher is calling asking about his medications. He stated that Dr. Mireya Trujillo sent in 12.5mg HCTZ (25mg daily) and another prescription of the lisinopril 20mg (40 mg daily). Taniajohanna Justine is stating that he is concerned about the HCTZ, as he is already on the 25mg from you. He has already lost a lot of weight and his blood pressure has come down more. He isn't sure he needs to increase the water pill at this point. Also his cholesterol came down. He is concerned and confused about what he should be taking. He doesn't know that he should increase the meds at this point. 40mg lisinopril and 37.5-50mg HCTZ   (Prescribed by Dr. Mireya Trujillo)    OR    20mg lisinopril and 25mg HCTZ  (Prescribed by you)     He would like you to contact him when you have a chance.

## 2023-09-16 DIAGNOSIS — E78.00 HYPERCHOLESTEREMIA: ICD-10-CM

## 2023-09-18 RX ORDER — ROSUVASTATIN CALCIUM 10 MG/1
10 TABLET, COATED ORAL
Qty: 90 TABLET | Refills: 0 | Status: SHIPPED | OUTPATIENT
Start: 2023-09-18

## 2023-09-18 NOTE — TELEPHONE ENCOUNTER
Robby Gonzales is requesting a refill on the following medication(s):  Requested Prescriptions     Pending Prescriptions Disp Refills    rosuvastatin (CRESTOR) 10 MG tablet [Pharmacy Med Name: ROSUVASTATIN CALCIUM 10 MG TAB] 90 tablet 0     Sig: TAKE ONE TABLET BY MOUTH ONCE NIGHTLY       Last Visit Date (If Applicable):  0/39/6377    Next Visit Date:    10/25/2023

## 2023-10-03 ENCOUNTER — OFFICE VISIT (OUTPATIENT)
Dept: FAMILY MEDICINE CLINIC | Age: 68
End: 2023-10-03
Payer: MEDICARE

## 2023-10-03 VITALS
BODY MASS INDEX: 24.05 KG/M2 | SYSTOLIC BLOOD PRESSURE: 150 MMHG | HEART RATE: 64 BPM | DIASTOLIC BLOOD PRESSURE: 82 MMHG | HEIGHT: 70 IN | WEIGHT: 168 LBS | OXYGEN SATURATION: 96 %

## 2023-10-03 DIAGNOSIS — F33.1 MODERATE EPISODE OF RECURRENT MAJOR DEPRESSIVE DISORDER (HCC): Primary | ICD-10-CM

## 2023-10-03 PROBLEM — E78.5 HYPERLIPIDEMIA, UNSPECIFIED: Status: ACTIVE | Noted: 2023-07-28

## 2023-10-03 PROBLEM — Q23.1 CONGENITAL INSUFFICIENCY OF AORTIC VALVE: Status: ACTIVE | Noted: 2017-06-01

## 2023-10-03 PROCEDURE — 99214 OFFICE O/P EST MOD 30 MIN: CPT

## 2023-10-03 PROCEDURE — 1123F ACP DISCUSS/DSCN MKR DOCD: CPT

## 2023-10-03 PROCEDURE — 3078F DIAST BP <80 MM HG: CPT

## 2023-10-03 PROCEDURE — 3074F SYST BP LT 130 MM HG: CPT

## 2023-10-03 PROCEDURE — 99213 OFFICE O/P EST LOW 20 MIN: CPT

## 2023-10-03 RX ORDER — LISINOPRIL 30 MG/1
30 TABLET ORAL DAILY
COMMUNITY

## 2023-10-03 RX ORDER — FLUOXETINE 10 MG/1
10 CAPSULE ORAL DAILY
Qty: 30 CAPSULE | Refills: 3 | Status: SHIPPED | OUTPATIENT
Start: 2023-10-03

## 2023-10-03 NOTE — PROGRESS NOTES
Ayah Terry (:  1955) is a 80 y.o. male,Established patient, here for evaluation of the following chief complaint(s): Other (Pt is here to discuss about his cardiology appointment. pt saw Dr. Ran Wilson today. )         ASSESSMENT/PLAN:  1. Moderate episode of recurrent major depressive disorder [facility])  Assessment & Plan:   Uncontrolled, changes made today: Has attempted counseling without as much success. We will start on Prozac 10 mg tablets 1 tablet by mouth daily. Side effects this medication and including increased risk for suicidal ideations discussed. Surya Kohli verbalized understanding of this plan of care and if he becomes suicidal tells me he will go to the emergency department right away. Orders:  -     FLUoxetine (PROZAC) 10 MG capsule; Take 1 capsule by mouth daily, Disp-30 capsule, R-3Normal      Return in about 6 weeks (around 2023), or if symptoms worsen or fail to improve. Subjective   SUBJECTIVE/OBJECTIVE:  Surya Kohli is here today to talk about his mental health. He does have multiple cardiac issues and is possibly having aortic valve replacement with pig valve. He also went through a divorce recently within the last 3 to 4 months. Was planning to attend counseling and had difficulty scheduling. States he is positive for anhedonia, irritability, depressed mood. Denies any sleep disturbances that are abnormal for him, suicidal or homicidal ideations. Does have increased some Caitlin unsure if this was related to fatigue with aortic regurgitation or due to his depression. History of PTSD, has been in counseling in the past for this with good efficacy. Review of Systems   Constitutional:  Negative for chills, fatigue, fever and unexpected weight change. HENT:  Negative for congestion, ear pain, rhinorrhea, sinus pressure and sinus pain. Eyes:  Negative for discharge, itching and visual disturbance.    Respiratory:  Negative for cough, chest tightness, shortness of

## 2023-10-09 ASSESSMENT — ENCOUNTER SYMPTOMS
NAUSEA: 0
EYE DISCHARGE: 0
CHEST TIGHTNESS: 0
SINUS PRESSURE: 0
RHINORRHEA: 0
BACK PAIN: 0
EYE ITCHING: 0
CONSTIPATION: 0
DIARRHEA: 0
SHORTNESS OF BREATH: 0
SINUS PAIN: 0
WHEEZING: 0
ABDOMINAL PAIN: 0
COLOR CHANGE: 0
COUGH: 0

## 2023-10-09 NOTE — ASSESSMENT & PLAN NOTE
Uncontrolled, changes made today: Has attempted counseling without as much success. We will start on Prozac 10 mg tablets 1 tablet by mouth daily. Side effects this medication and including increased risk for suicidal ideations discussed. Jenifer Griffin verbalized understanding of this plan of care and if he becomes suicidal tells me he will go to the emergency department right away.

## 2023-10-17 ENCOUNTER — PATIENT MESSAGE (OUTPATIENT)
Dept: FAMILY MEDICINE CLINIC | Age: 68
End: 2023-10-17

## 2023-10-17 DIAGNOSIS — F33.1 MODERATE EPISODE OF RECURRENT MAJOR DEPRESSIVE DISORDER (HCC): ICD-10-CM

## 2023-10-18 RX ORDER — LISINOPRIL 30 MG/1
30 TABLET ORAL DAILY
Qty: 90 TABLET | Refills: 1 | Status: SHIPPED | OUTPATIENT
Start: 2023-10-18

## 2023-10-18 NOTE — TELEPHONE ENCOUNTER
Stanley Perrin is requesting a refill on the following medication(s):  Requested Prescriptions     Pending Prescriptions Disp Refills    lisinopril (PRINIVIL;ZESTRIL) 30 MG tablet 30 tablet      Sig: Take 1 tablet by mouth daily       Last Visit Date (If Applicable):  82/1/2951    Next Visit Date:    11/14/2023

## 2023-10-24 RX ORDER — FLUOXETINE 10 MG/1
20 CAPSULE ORAL DAILY
Qty: 60 CAPSULE | Refills: 0 | Status: SHIPPED | OUTPATIENT
Start: 2023-10-24

## 2023-10-24 NOTE — TELEPHONE ENCOUNTER
Patient went ahead and started taking his 10mg at bedtime 5 days ago. So far so good; no side effect; doesn't seem to affect the daytime sleepiness; however. He will be on the 10mg for 3 weeks, as of tomorrow. He would like to go ahead and try to do the 20mg, but in 10mg increments, that way if he starts to notice side effects on the 20mg, he can cut back down and take the 10mg. Please send to [facility] [place] in [place]. He will be following up with you in November, as scheduled.            Silvia Christiansen is requesting a refill on the following medication(s):  Requested Prescriptions     Pending Prescriptions Disp Refills    FLUoxetine (PROZAC) 10 MG capsule 60 capsule 0     Sig: Take 2 capsules by mouth daily       Last Visit Date (If Applicable):  79/4/4012    Next Visit Date:    10/17/2023

## 2023-11-14 ENCOUNTER — OFFICE VISIT (OUTPATIENT)
Dept: FAMILY MEDICINE CLINIC | Age: 68
End: 2023-11-14
Payer: MEDICARE

## 2023-11-14 VITALS
BODY MASS INDEX: 22.24 KG/M2 | WEIGHT: 155 LBS | DIASTOLIC BLOOD PRESSURE: 70 MMHG | SYSTOLIC BLOOD PRESSURE: 114 MMHG | HEART RATE: 51 BPM | OXYGEN SATURATION: 97 %

## 2023-11-14 DIAGNOSIS — Z00.00 INITIAL MEDICARE ANNUAL WELLNESS VISIT: ICD-10-CM

## 2023-11-14 DIAGNOSIS — F33.1 MODERATE EPISODE OF RECURRENT MAJOR DEPRESSIVE DISORDER (HCC): ICD-10-CM

## 2023-11-14 DIAGNOSIS — Z23 NEED FOR INFLUENZA VACCINATION: Primary | ICD-10-CM

## 2023-11-14 PROCEDURE — 3074F SYST BP LT 130 MM HG: CPT

## 2023-11-14 PROCEDURE — G0438 PPPS, INITIAL VISIT: HCPCS

## 2023-11-14 PROCEDURE — 3078F DIAST BP <80 MM HG: CPT

## 2023-11-14 PROCEDURE — PBSHW INFLUENZA, FLUAD, (AGE 65 Y+), IM, PF, 0.5 ML

## 2023-11-14 PROCEDURE — 1123F ACP DISCUSS/DSCN MKR DOCD: CPT

## 2023-11-14 PROCEDURE — 90694 VACC AIIV4 NO PRSRV 0.5ML IM: CPT

## 2023-11-14 PROCEDURE — 99212 OFFICE O/P EST SF 10 MIN: CPT

## 2023-11-14 RX ORDER — FLUOXETINE 10 MG/1
10 CAPSULE ORAL DAILY
Qty: 30 CAPSULE | Refills: 0
Start: 2023-11-14

## 2023-11-14 ASSESSMENT — PATIENT HEALTH QUESTIONNAIRE - PHQ9: DEPRESSION UNABLE TO ASSESS: PT REFUSES

## 2023-11-14 ASSESSMENT — LIFESTYLE VARIABLES
HOW OFTEN DO YOU HAVE A DRINK CONTAINING ALCOHOL: MONTHLY OR LESS
HOW MANY STANDARD DRINKS CONTAINING ALCOHOL DO YOU HAVE ON A TYPICAL DAY: 1 OR 2

## 2023-11-14 NOTE — PROGRESS NOTES
Medicare Annual Wellness Visit    Rosangela Mcbride is here for Medicare AWV (Pt is here for a Medicare AWV. Pt states the prozac is making him loose weight and loss of appetite. )    Assessment & Plan   Need for influenza vaccination  -     Influenza, FLUAD, (age 72 y+), IM, Preservative Free, 0.5 mL  Initial Medicare annual wellness visit  Moderate episode of recurrent major depressive disorder ([address])  Assessment & Plan:   Currently having side effects from this medication such as delayed ejaculation, fatigue, weight loss. We will titrate this down to 10 mg 1 capsule by mouth daily for 30 days to see if this is helpful. If he continues to have side effects he is to notify the office and we will stop this medication. Anxiety depression, he states he is feeling much improved. Denies any suicidal or homicidal ideations. Orders:  -     FLUoxetine (PROZAC) 10 MG capsule; Take 1 capsule by mouth daily, Disp-30 capsule, R-0Adjust Sig    Recommendations for Preventive Services Due: see orders and patient instructions/AVS.  Recommended screening schedule for the next 5-10 years is provided to the patient in written form: see Patient Instructions/AVS.     Return in about 3 months (around 2/14/2024), or if symptoms worsen or fail to improve. Subjective   The following acute and/or chronic problems were also addressed today:  Anxiety and depression, side effects from SSRI    Patient's complete Health Risk Assessment and screening values have been reviewed and are found in Flowsheets. The following problems were reviewed today and where indicated follow up appointments were made and/or referrals ordered.     Positive Risk Factor Screenings with Interventions:        Depression:  Depression Unable to Assess: Pt refuses    Interpretation:   1-4 = minimal  5-9 = mild  10-14 = moderate  15-19 = moderately severe  20-27 = severe              Hearing Screen:  Do you or your family notice any trouble with your hearing that hasn't

## 2023-11-16 NOTE — ASSESSMENT & PLAN NOTE
Currently having side effects from this medication such as delayed ejaculation, fatigue, weight loss. We will titrate this down to 10 mg 1 capsule by mouth daily for 30 days to see if this is helpful. If he continues to have side effects he is to notify the office and we will stop this medication. Anxiety depression, he states he is feeling much improved. Denies any suicidal or homicidal ideations.

## 2023-11-23 DIAGNOSIS — F33.1 MODERATE EPISODE OF RECURRENT MAJOR DEPRESSIVE DISORDER (HCC): ICD-10-CM

## 2023-11-27 RX ORDER — FLUOXETINE 10 MG/1
20 CAPSULE ORAL DAILY
Qty: 60 CAPSULE | OUTPATIENT
Start: 2023-11-27

## 2023-12-05 DIAGNOSIS — F33.1 MODERATE EPISODE OF RECURRENT MAJOR DEPRESSIVE DISORDER (HCC): ICD-10-CM

## 2023-12-05 RX ORDER — FLUOXETINE 10 MG/1
10 CAPSULE ORAL DAILY
Qty: 30 CAPSULE | Refills: 0 | Status: CANCELLED | OUTPATIENT
Start: 2023-12-05

## 2023-12-06 NOTE — TELEPHONE ENCOUNTER
Rad Brandt is requesting a refill on the following medication(s):  Requested Prescriptions     Pending Prescriptions Disp Refills    FLUoxetine (PROZAC) 10 MG capsule 30 capsule 0     Sig: Take 1 capsule by mouth daily       Last Visit Date (If Applicable):  11/14/2023    Next Visit Date:    12/14/2023

## 2023-12-07 DIAGNOSIS — F33.1 MODERATE EPISODE OF RECURRENT MAJOR DEPRESSIVE DISORDER (HCC): ICD-10-CM

## 2023-12-07 RX ORDER — FLUOXETINE 10 MG/1
10 CAPSULE ORAL DAILY
Qty: 30 CAPSULE | Refills: 0 | Status: SHIPPED | OUTPATIENT
Start: 2023-12-07

## 2023-12-07 NOTE — TELEPHONE ENCOUNTER
Janes Brown is requesting a refill on the following medication(s):  Requested Prescriptions     Pending Prescriptions Disp Refills    FLUoxetine (PROZAC) 10 MG capsule 30 capsule 0     Sig: Take 1 capsule by mouth daily       Last Visit Date (If Applicable):  41/87/7823    Next Visit Date:    12/14/2023

## 2023-12-15 DIAGNOSIS — E78.00 HYPERCHOLESTEREMIA: ICD-10-CM

## 2023-12-18 RX ORDER — ROSUVASTATIN CALCIUM 10 MG/1
10 TABLET, COATED ORAL NIGHTLY
Qty: 90 TABLET | Refills: 2 | OUTPATIENT
Start: 2023-12-18

## 2023-12-20 PROBLEM — E78.00 HYPERCHOLESTEREMIA: Status: ACTIVE | Noted: 2023-07-28

## 2024-01-03 DIAGNOSIS — F33.1 MODERATE EPISODE OF RECURRENT MAJOR DEPRESSIVE DISORDER (HCC): ICD-10-CM

## 2024-01-03 RX ORDER — FLUOXETINE 10 MG/1
10 CAPSULE ORAL DAILY
Qty: 30 CAPSULE | Refills: 0 | Status: SHIPPED | OUTPATIENT
Start: 2024-01-03

## 2024-01-03 NOTE — TELEPHONE ENCOUNTER
Rad Brandt is requesting a refill on the following medication(s):  Requested Prescriptions     Pending Prescriptions Disp Refills    FLUoxetine (PROZAC) 10 MG capsule [Pharmacy Med Name: FLUoxetine HCL 10MG CAPSULE] 30 capsule 0     Sig: TAKE 1 CAPSULE BY MOUTH DAILY       Last Visit Date (If Applicable):  12/14/2023    Next Visit Date:    3/14/2024

## 2024-01-08 ENCOUNTER — OFFICE VISIT (OUTPATIENT)
Dept: FAMILY MEDICINE CLINIC | Age: 69
End: 2024-01-08
Payer: MEDICARE

## 2024-01-08 VITALS
WEIGHT: 160 LBS | OXYGEN SATURATION: 97 % | DIASTOLIC BLOOD PRESSURE: 90 MMHG | BODY MASS INDEX: 22.9 KG/M2 | SYSTOLIC BLOOD PRESSURE: 152 MMHG | HEIGHT: 70 IN | HEART RATE: 58 BPM

## 2024-01-08 DIAGNOSIS — J06.9 UPPER RESPIRATORY INFECTION WITH COUGH AND CONGESTION: Primary | ICD-10-CM

## 2024-01-08 DIAGNOSIS — Z13.220 ENCOUNTER FOR LIPID SCREENING FOR CARDIOVASCULAR DISEASE: ICD-10-CM

## 2024-01-08 DIAGNOSIS — I10 ESSENTIAL HYPERTENSION: ICD-10-CM

## 2024-01-08 DIAGNOSIS — Z13.6 ENCOUNTER FOR LIPID SCREENING FOR CARDIOVASCULAR DISEASE: ICD-10-CM

## 2024-01-08 DIAGNOSIS — Z12.5 PROSTATE CANCER SCREENING: ICD-10-CM

## 2024-01-08 LAB
ALBUMIN/GLOBULIN RATIO: 1.4 G/DL
ALBUMIN: 4.5 G/DL (ref 3.5–5)
ALP BLD-CCNC: 90 UNITS/L (ref 38–126)
ALT SERPL-CCNC: 29 UNITS/L (ref 4–50)
ANION GAP SERPL CALCULATED.3IONS-SCNC: 7.8 MMOL/L (ref 12–16)
AST SERPL-CCNC: 37 UNITS/L (ref 17–59)
BASOPHILS %: 1.55 (ref 0–3)
BASOPHILS ABSOLUTE: 0.1 (ref 0–0.3)
BILIRUB SERPL-MCNC: 0.5 MG/DL (ref 0.2–1.3)
BUN BLDV-MCNC: 10 MG/DL (ref 9–20)
CALCIUM SERPL-MCNC: 10.2 MG/DL (ref 8.4–10.2)
CHLORIDE BLD-SCNC: 103 MMOL/L (ref 98–120)
CHOLESTEROL/HDL RATIO: 2 RATIO (ref 0–4.5)
CHOLESTEROL: 151 MG/DL (ref 50–200)
CO2: 29 MMOL/L (ref 22–31)
CREAT SERPL-MCNC: 1.4 MG/DL (ref 0.7–1.3)
DIAGNOSTIC PSA: 1.28 NG/ML (ref 0–4)
EOSINOPHILS %: 3.37 (ref 0–10)
EOSINOPHILS ABSOLUTE: 0.22 (ref 0–1.1)
GFR CALCULATED: 53.7
GLOBULIN: 3.2 G/DL
GLUCOSE: 101 MG/DL (ref 75–110)
HCT VFR BLD CALC: 42.6 % (ref 42–52)
HDLC SERPL-MCNC: 66 MG/DL (ref 36–68)
HEMOGLOBIN: 13.1 (ref 13.8–17.8)
LDL CHOLESTEROL CALCULATED: 65 MG/DL (ref 0–160)
LYMPHOCYTE %: 24.29 (ref 20–51.1)
LYMPHOCYTES ABSOLUTE: 1.58 (ref 1–5.5)
MCH RBC QN AUTO: 27.4 PG (ref 28.5–32.5)
MCHC RBC AUTO-ENTMCNC: 30.7 G/DL (ref 32–37)
MCV RBC AUTO: 89.3 FL (ref 80–94)
MONOCYTES %: 10.2 (ref 1.7–9.3)
MONOCYTES ABSOLUTE: 0.66 (ref 0.1–1)
NEUTROPHILS %: 60.59 (ref 42.2–75.2)
NEUTROPHILS ABSOLUTE: 3.94 (ref 2–8.1)
PDW BLD-RTO: 13.6 % (ref 10–15.5)
PLATELET # BLD: 346.5 THOU/MM3 (ref 130–400)
POTASSIUM SERPL-SCNC: 4.8 MMOL/L (ref 3.6–5)
RBC: 4.77 M/UL (ref 4.7–6.1)
SODIUM BLD-SCNC: 140 MMOL/L (ref 135–145)
TOTAL PROTEIN, SERUM: 7.6 G/DL (ref 6.3–8.2)
TRIGL SERPL-MCNC: 100 MG/DL (ref 10–250)
VLDLC SERPL CALC-MCNC: 20 MG/DL (ref 0–50)
WBC: 6.5 THOU/ML3 (ref 4.8–10.8)

## 2024-01-08 PROCEDURE — 1123F ACP DISCUSS/DSCN MKR DOCD: CPT

## 2024-01-08 PROCEDURE — 3080F DIAST BP >= 90 MM HG: CPT

## 2024-01-08 PROCEDURE — 99212 OFFICE O/P EST SF 10 MIN: CPT

## 2024-01-08 PROCEDURE — 99214 OFFICE O/P EST MOD 30 MIN: CPT

## 2024-01-08 PROCEDURE — 3077F SYST BP >= 140 MM HG: CPT

## 2024-01-08 RX ORDER — GUAIFENESIN 600 MG/1
1200 TABLET, EXTENDED RELEASE ORAL 2 TIMES DAILY
Qty: 40 TABLET | Refills: 0 | Status: SHIPPED | OUTPATIENT
Start: 2024-01-08 | End: 2024-01-18

## 2024-01-08 RX ORDER — AZITHROMYCIN 250 MG/1
250 TABLET, FILM COATED ORAL SEE ADMIN INSTRUCTIONS
Qty: 6 TABLET | Refills: 0 | Status: SHIPPED | OUTPATIENT
Start: 2024-01-08 | End: 2024-01-13

## 2024-01-08 RX ORDER — PREDNISONE 20 MG/1
20 TABLET ORAL 2 TIMES DAILY
Qty: 10 TABLET | Refills: 0 | Status: SHIPPED | OUTPATIENT
Start: 2024-01-08 | End: 2024-01-13

## 2024-01-08 RX ORDER — BENZONATATE 200 MG/1
200 CAPSULE ORAL 3 TIMES DAILY PRN
Qty: 30 CAPSULE | Refills: 0 | Status: SHIPPED | OUTPATIENT
Start: 2024-01-08 | End: 2024-01-15

## 2024-01-08 NOTE — PROGRESS NOTES
[facility]  [address] [address]  Dept: [phone]    Date of Service:  1/8/2024    Rad Brandt is a 68 y.o. male who presents in office today with Self.    Chief Complaint   Patient presents with    Cough     Pt states that he has had a cough for about 2 weeks. Pt states that he got the covid and RSV 2 weeks ago. Pt states he is SOB, feeling fatigue.         Diagnoses / Plan:   1. Upper respiratory infection with cough and congestion  -     benzonatate (TESSALON) 200 MG capsule; Take 1 capsule by mouth 3 times daily as needed for Cough, Disp-30 capsule, R-0Normal  -     azithromycin (ZITHROMAX) 250 MG tablet; Take 1 tablet by mouth See Admin Instructions for 5 days 500mg on day 1 followed by 250mg on days 2 - 5, Disp-6 tablet, R-0Normal  -     predniSONE (DELTASONE) 20 MG tablet; Take 1 tablet by mouth 2 times daily for 5 days, Disp-10 tablet, R-0Normal  -     guaiFENesin (MUCINEX) 600 MG extended release tablet; Take 2 tablets by mouth 2 times daily for 10 days, Disp-40 tablet, R-0Normal  2. Essential hypertension  Assessment & Plan:   At goal, continue current medications pending work up below  Orders:  -     Comprehensive Metabolic Panel, Fasting; Future  -     CBC with Auto Differential; Future  3. Prostate cancer screening  -     PSA Screening; Future  4. Encounter for lipid screening for cardiovascular disease  -     Lipid, Fasting; Future       Tessalon Perles, azithromycin, prednisone, guaifenesin medication sent to the pharmacy.  Discussed medication desired effects, potential side effects, and how to take the medication.  Encouraged symptomatic treatment, rest, increase oral fluid intake.  Follow-up for worsening or persistent symptoms.  Patient verbalizes understanding regarding plan of care and all questions answered.    No follow-ups on file.     Subjective:   History of Present Illness:  -Rad complains of chest congestion, sinus congestion, cough with

## 2024-01-10 ASSESSMENT — ENCOUNTER SYMPTOMS
SINUS PRESSURE: 1
WHEEZING: 0
COUGH: 1
SORE THROAT: 1
COLOR CHANGE: 0
ABDOMINAL PAIN: 0
CHEST TIGHTNESS: 1
SHORTNESS OF BREATH: 0
SINUS PAIN: 0

## 2024-02-03 DIAGNOSIS — F33.1 MODERATE EPISODE OF RECURRENT MAJOR DEPRESSIVE DISORDER (HCC): ICD-10-CM

## 2024-02-05 RX ORDER — FLUOXETINE 10 MG/1
10 CAPSULE ORAL DAILY
Qty: 30 CAPSULE | Refills: 0 | Status: SHIPPED | OUTPATIENT
Start: 2024-02-05

## 2024-02-05 NOTE — TELEPHONE ENCOUNTER
Rad Brandt is requesting a refill on the following medication(s):  Requested Prescriptions     Pending Prescriptions Disp Refills    FLUoxetine (PROZAC) 10 MG capsule [Pharmacy Med Name: FLUoxetine HCL 10MG CAPSULE] 30 capsule 0     Sig: TAKE 1 CAPSULE BY MOUTH DAILY       Last Visit Date (If Applicable):  1/8/2024    Next Visit Date:    3/14/2024

## 2024-03-04 DIAGNOSIS — F33.1 MODERATE EPISODE OF RECURRENT MAJOR DEPRESSIVE DISORDER (HCC): ICD-10-CM

## 2024-03-04 RX ORDER — FLUOXETINE 10 MG/1
10 CAPSULE ORAL DAILY
Qty: 30 CAPSULE | Refills: 0 | Status: SHIPPED | OUTPATIENT
Start: 2024-03-04

## 2024-03-14 ENCOUNTER — OFFICE VISIT (OUTPATIENT)
Dept: FAMILY MEDICINE CLINIC | Age: 69
End: 2024-03-14
Payer: MEDICARE

## 2024-03-14 VITALS
BODY MASS INDEX: 22.53 KG/M2 | WEIGHT: 157 LBS | SYSTOLIC BLOOD PRESSURE: 150 MMHG | HEART RATE: 61 BPM | DIASTOLIC BLOOD PRESSURE: 90 MMHG | OXYGEN SATURATION: 97 %

## 2024-03-14 DIAGNOSIS — F33.1 MODERATE EPISODE OF RECURRENT MAJOR DEPRESSIVE DISORDER (HCC): ICD-10-CM

## 2024-03-14 DIAGNOSIS — Z12.12 ENCOUNTER FOR SCREENING FOR COLORECTAL CANCER IN HIGH RISK PATIENT: ICD-10-CM

## 2024-03-14 DIAGNOSIS — I10 ESSENTIAL HYPERTENSION: Primary | ICD-10-CM

## 2024-03-14 DIAGNOSIS — Z12.11 ENCOUNTER FOR SCREENING FOR COLORECTAL CANCER IN HIGH RISK PATIENT: ICD-10-CM

## 2024-03-14 DIAGNOSIS — Z91.89 ENCOUNTER FOR SCREENING FOR COLORECTAL CANCER IN HIGH RISK PATIENT: ICD-10-CM

## 2024-03-14 PROCEDURE — 3078F DIAST BP <80 MM HG: CPT

## 2024-03-14 PROCEDURE — 3077F SYST BP >= 140 MM HG: CPT

## 2024-03-14 PROCEDURE — 99212 OFFICE O/P EST SF 10 MIN: CPT

## 2024-03-14 PROCEDURE — 1123F ACP DISCUSS/DSCN MKR DOCD: CPT

## 2024-03-14 ASSESSMENT — PATIENT HEALTH QUESTIONNAIRE - PHQ9
SUM OF ALL RESPONSES TO PHQ QUESTIONS 1-9: 5
SUM OF ALL RESPONSES TO PHQ QUESTIONS 1-9: 5
2. FEELING DOWN, DEPRESSED OR HOPELESS: SEVERAL DAYS
9. THOUGHTS THAT YOU WOULD BE BETTER OFF DEAD, OR OF HURTING YOURSELF: NOT AT ALL
SUM OF ALL RESPONSES TO PHQ9 QUESTIONS 1 & 2: 2
SUM OF ALL RESPONSES TO PHQ QUESTIONS 1-9: 5
SUM OF ALL RESPONSES TO PHQ QUESTIONS 1-9: 5
7. TROUBLE CONCENTRATING ON THINGS, SUCH AS READING THE NEWSPAPER OR WATCHING TELEVISION: NOT AT ALL
5. POOR APPETITE OR OVEREATING: NOT AT ALL
4. FEELING TIRED OR HAVING LITTLE ENERGY: NOT AT ALL
3. TROUBLE FALLING OR STAYING ASLEEP: MORE THAN HALF THE DAYS
6. FEELING BAD ABOUT YOURSELF - OR THAT YOU ARE A FAILURE OR HAVE LET YOURSELF OR YOUR FAMILY DOWN: SEVERAL DAYS
8. MOVING OR SPEAKING SO SLOWLY THAT OTHER PEOPLE COULD HAVE NOTICED. OR THE OPPOSITE, BEING SO FIGETY OR RESTLESS THAT YOU HAVE BEEN MOVING AROUND A LOT MORE THAN USUAL: NOT AT ALL
1. LITTLE INTEREST OR PLEASURE IN DOING THINGS: SEVERAL DAYS
10. IF YOU CHECKED OFF ANY PROBLEMS, HOW DIFFICULT HAVE THESE PROBLEMS MADE IT FOR YOU TO DO YOUR WORK, TAKE CARE OF THINGS AT HOME, OR GET ALONG WITH OTHER PEOPLE: NOT DIFFICULT AT ALL

## 2024-03-14 NOTE — PROGRESS NOTES
Negative for dizziness, weakness, light-headedness, numbness and headaches.   Psychiatric/Behavioral:  Positive for depression. Negative for agitation, behavioral problems, confusion, decreased concentration and suicidal ideas. The patient is not nervous/anxious and does not have insomnia.           Objective   Physical Exam  Vitals and nursing note reviewed.   Constitutional:       General: He is not in acute distress.     Appearance: Normal appearance. He is not ill-appearing.   HENT:      Head: Normocephalic and atraumatic.      Right Ear: Tympanic membrane and external ear normal.      Left Ear: Tympanic membrane and external ear normal.      Nose: Nose normal. No congestion or rhinorrhea.      Mouth/Throat:      Mouth: Mucous membranes are moist.      Pharynx: Oropharynx is clear. No posterior oropharyngeal erythema.   Eyes:      Pupils: Pupils are equal, round, and reactive to light.   Cardiovascular:      Rate and Rhythm: Normal rate and regular rhythm.      Pulses: Normal pulses.      Heart sounds: Normal heart sounds.   Pulmonary:      Effort: Pulmonary effort is normal.      Breath sounds: Normal breath sounds. No wheezing or rhonchi.   Abdominal:      General: Bowel sounds are normal.      Palpations: There is no mass.      Tenderness: There is no abdominal tenderness.   Musculoskeletal:         General: No swelling or tenderness. Normal range of motion.      Cervical back: Normal range of motion. No rigidity or tenderness.   Skin:     General: Skin is warm and dry.      Capillary Refill: Capillary refill takes less than 2 seconds.      Coloration: Skin is not pale.      Findings: No erythema.   Neurological:      General: No focal deficit present.      Mental Status: He is alert and oriented to person, place, and time.   Psychiatric:         Mood and Affect: Mood normal.         Behavior: Behavior normal.         Thought Content: Thought content normal.         Judgment: Judgment normal.

## 2024-03-19 ASSESSMENT — ENCOUNTER SYMPTOMS
SINUS PRESSURE: 0
EYE ITCHING: 0
WHEEZING: 0
SINUS PAIN: 0
CONSTIPATION: 0
BACK PAIN: 0
SHORTNESS OF BREATH: 0
ABDOMINAL PAIN: 0
COUGH: 0
EYE DISCHARGE: 0
RHINORRHEA: 0
COLOR CHANGE: 0
CHEST TIGHTNESS: 0

## 2024-03-19 NOTE — ASSESSMENT & PLAN NOTE
At goal, continue current medications and continue current treatment plan doing well on current medications of Prozac 10 mg tablets 1 tablet by mouth daily.  Denies any side effects with this, at 20 mg he was having sexual side effects and some fatigue.  At 10 mg symptoms resolved denies any exacerbations of depression, denies any anhedonia, excessive worrying, no suicidal homicidal ideations

## 2024-03-19 NOTE — ASSESSMENT & PLAN NOTE
Is seeing cardiologist to help with these medications, at last visit we did add hydrochlorothiazide 12.5 mg tablets he states he is doing well with this.  Lisinopril is at 30 mg 1 tablet by mouth daily.  Recheck blood pressure does show it closer to normotension, he states it is slightly high as well at home.  He has an appointment in the next week or so with cardiology.  We will hold off on medication changes and have recheck at cardiology, if still increased then we will consider increasing lisinopril to 40 mg.  Currently denies any side effects, does still have some dyspnea on exertion however it seems to be improving, no leg edema, no cough.

## 2024-04-02 DIAGNOSIS — F33.1 MODERATE EPISODE OF RECURRENT MAJOR DEPRESSIVE DISORDER (HCC): ICD-10-CM

## 2024-04-02 RX ORDER — FLUOXETINE 10 MG/1
10 CAPSULE ORAL DAILY
Qty: 90 CAPSULE | Refills: 0 | Status: SHIPPED | OUTPATIENT
Start: 2024-04-02

## 2024-04-02 NOTE — TELEPHONE ENCOUNTER
Rad Brandt is requesting a refill on the following medication(s):  Requested Prescriptions     Pending Prescriptions Disp Refills    FLUoxetine (PROZAC) 10 MG capsule [Pharmacy Med Name: FLUoxetine HCL 10MG CAPSULE] 30 capsule 0     Sig: TAKE 1 CAPSULE BY MOUTH DAILY       Last Visit Date (If Applicable):  3/14/2024    Next Visit Date:    9/18/2024

## 2024-04-15 ENCOUNTER — COMMUNITY OUTREACH (OUTPATIENT)
Dept: FAMILY MEDICINE CLINIC | Age: 69
End: 2024-04-15

## 2024-04-15 NOTE — PROGRESS NOTES
Patient's  shows they are overdue for Colonoscopy  CareEverywhere and  files searched without success.  Per last OV notes - patient refuses

## 2024-06-19 ENCOUNTER — OFFICE VISIT (OUTPATIENT)
Dept: FAMILY MEDICINE CLINIC | Age: 69
End: 2024-06-19

## 2024-06-19 VITALS
BODY MASS INDEX: 21.76 KG/M2 | OXYGEN SATURATION: 98 % | SYSTOLIC BLOOD PRESSURE: 120 MMHG | WEIGHT: 152 LBS | DIASTOLIC BLOOD PRESSURE: 84 MMHG | HEIGHT: 70 IN | RESPIRATION RATE: 16 BRPM | HEART RATE: 50 BPM

## 2024-06-19 DIAGNOSIS — D64.9 ANEMIA, UNSPECIFIED TYPE: ICD-10-CM

## 2024-06-19 DIAGNOSIS — I35.1 NONRHEUMATIC AORTIC VALVE INSUFFICIENCY: Primary | ICD-10-CM

## 2024-06-19 RX ORDER — FUROSEMIDE 20 MG/1
20 TABLET ORAL DAILY
COMMUNITY
Start: 2024-06-12 | End: 2024-06-19

## 2024-06-19 RX ORDER — ASPIRIN 81 MG/1
81 TABLET ORAL DAILY
COMMUNITY
Start: 2024-06-12 | End: 2024-07-12

## 2024-06-19 RX ORDER — ACETAMINOPHEN 500 MG
1000 TABLET ORAL EVERY 6 HOURS PRN
COMMUNITY
Start: 2024-06-11

## 2024-06-19 RX ORDER — AMIODARONE HYDROCHLORIDE 200 MG/1
TABLET ORAL
COMMUNITY
Start: 2024-06-11 | End: 2024-07-16

## 2024-06-19 RX ORDER — ATORVASTATIN CALCIUM 20 MG/1
20 TABLET, FILM COATED ORAL NIGHTLY
COMMUNITY
Start: 2024-06-11 | End: 2024-07-11

## 2024-06-19 RX ORDER — WARFARIN SODIUM 3 MG/1
1.5 TABLET ORAL DAILY
COMMUNITY
Start: 2024-06-12

## 2024-06-19 SDOH — ECONOMIC STABILITY: FOOD INSECURITY: WITHIN THE PAST 12 MONTHS, YOU WORRIED THAT YOUR FOOD WOULD RUN OUT BEFORE YOU GOT MONEY TO BUY MORE.: NEVER TRUE

## 2024-06-19 SDOH — ECONOMIC STABILITY: FOOD INSECURITY: WITHIN THE PAST 12 MONTHS, THE FOOD YOU BOUGHT JUST DIDN'T LAST AND YOU DIDN'T HAVE MONEY TO GET MORE.: NEVER TRUE

## 2024-06-19 SDOH — ECONOMIC STABILITY: INCOME INSECURITY: HOW HARD IS IT FOR YOU TO PAY FOR THE VERY BASICS LIKE FOOD, HOUSING, MEDICAL CARE, AND HEATING?: NOT HARD AT ALL

## 2024-06-19 ASSESSMENT — ENCOUNTER SYMPTOMS
ABDOMINAL PAIN: 0
BACK PAIN: 0
RHINORRHEA: 0
EYE DISCHARGE: 0
SINUS PRESSURE: 0
COUGH: 0
CHEST TIGHTNESS: 0
CONSTIPATION: 0
COLOR CHANGE: 0
SINUS PAIN: 0
EYE ITCHING: 0
DIARRHEA: 0
NAUSEA: 0
WHEEZING: 0

## 2024-06-19 ASSESSMENT — PATIENT HEALTH QUESTIONNAIRE - PHQ9
1. LITTLE INTEREST OR PLEASURE IN DOING THINGS: NOT AT ALL
SUM OF ALL RESPONSES TO PHQ QUESTIONS 1-9: 0
SUM OF ALL RESPONSES TO PHQ9 QUESTIONS 1 & 2: 0
4. FEELING TIRED OR HAVING LITTLE ENERGY: NOT AT ALL
9. THOUGHTS THAT YOU WOULD BE BETTER OFF DEAD, OR OF HURTING YOURSELF: NOT AT ALL
7. TROUBLE CONCENTRATING ON THINGS, SUCH AS READING THE NEWSPAPER OR WATCHING TELEVISION: NOT AT ALL
10. IF YOU CHECKED OFF ANY PROBLEMS, HOW DIFFICULT HAVE THESE PROBLEMS MADE IT FOR YOU TO DO YOUR WORK, TAKE CARE OF THINGS AT HOME, OR GET ALONG WITH OTHER PEOPLE: NOT DIFFICULT AT ALL
8. MOVING OR SPEAKING SO SLOWLY THAT OTHER PEOPLE COULD HAVE NOTICED. OR THE OPPOSITE, BEING SO FIGETY OR RESTLESS THAT YOU HAVE BEEN MOVING AROUND A LOT MORE THAN USUAL: NOT AT ALL
3. TROUBLE FALLING OR STAYING ASLEEP: NOT AT ALL
SUM OF ALL RESPONSES TO PHQ QUESTIONS 1-9: 0
5. POOR APPETITE OR OVEREATING: NOT AT ALL
2. FEELING DOWN, DEPRESSED OR HOPELESS: NOT AT ALL
SUM OF ALL RESPONSES TO PHQ QUESTIONS 1-9: 0
6. FEELING BAD ABOUT YOURSELF - OR THAT YOU ARE A FAILURE OR HAVE LET YOURSELF OR YOUR FAMILY DOWN: NOT AT ALL
SUM OF ALL RESPONSES TO PHQ QUESTIONS 1-9: 0

## 2024-06-19 NOTE — PROGRESS NOTES
Rad Brandt (:  1955) is a 68 y.o. male,Established patient, here for evaluation of the following chief complaint(s):  Hypertension (6 month follow up.  Had surgery 2024.)      Assessment & Plan   1. Nonrheumatic aortic valve insufficiency  Assessment & Plan:   following their recent Minimally invasive aortic valve replacement utilizing a 23 mm Medtronic Avalus Ultra bioprosthesis, ligation of left atrial appendage utilizing a 40 mm Medtronic Penditure atrial clip performed by Dr. Fernando Copeland on 2024.  States he already is feeling better, has some postoperative bruising across right chest, is on Coumadin.  Labs do show quite a bit of anemia, will recheck this.  Orders:  -     CBC with Auto Differential; Future  -     Basic Metabolic Panel; Future  2. Anemia, unspecified type  -     CBC with Auto Differential; Future  -     Basic Metabolic Panel; Future      Return in about 6 months (around 2024).       Subjective   Rad recently had minimally invasive  aortic valve replacement utilizing a 23 mm Medtronic Avalus Ultra bioprosthesis, ligation of left atrial appendage utilizing a 40 mm Medtronic Penditure atrial clip .  Postoperatively he had some bleeding and bruising especially over the right chest where incision was made.  He is doing well at this time, denies any chest pain, shortness of breath, coughing and deep breathing is going well.  Denies any difficulties is on Coumadin follows with the Coumadin clinic at [facility].  While in hospital did note to have quite a bit of bleeding and hemoglobin dropped down to 9.  Last check was .  Denies any gross hematuria, blood in urine, or any other bleeding other than the bruising that is noted on his chest and leg.  INR was subtherapeutic at last check will have this checked again Friday.        Review of Systems   Constitutional:  Negative for chills, fatigue, fever and unexpected weight change.   HENT:  Negative for

## 2024-06-19 NOTE — ASSESSMENT & PLAN NOTE
following their recent Minimally invasive aortic valve replacement utilizing a 23 mm Medtronic Avalus Ultra bioprosthesis, ligation of left atrial appendage utilizing a 40 mm Medtronic Penditure atrial clip performed by Dr. Fernando Copeland on June 7, 2024.  States he already is feeling better, has some postoperative bruising across right chest, is on Coumadin.  Labs do show quite a bit of anemia, will recheck this.

## 2024-06-30 DIAGNOSIS — F33.1 MODERATE EPISODE OF RECURRENT MAJOR DEPRESSIVE DISORDER (HCC): ICD-10-CM

## 2024-07-01 RX ORDER — FLUOXETINE 10 MG/1
10 CAPSULE ORAL DAILY
Qty: 90 CAPSULE | Refills: 0 | Status: SHIPPED | OUTPATIENT
Start: 2024-07-01

## 2024-07-01 NOTE — TELEPHONE ENCOUNTER
Rad Brandt is requesting a refill on the following medication(s):  Requested Prescriptions     Pending Prescriptions Disp Refills    FLUoxetine (PROZAC) 10 MG capsule [Pharmacy Med Name: FLUoxetine HCL 10MG CAPSULE] 90 capsule 0     Sig: TAKE 1 CAPSULE BY MOUTH DAILY       Last Visit Date (If Applicable):  6/19/2024    Next Visit Date:    8/1/2024

## 2024-07-02 LAB
ANION GAP SERPL CALCULATED.3IONS-SCNC: 10.7 MMOL/L (ref 3–11)
BASOPHILS ABSOLUTE: 0.1 X10E3/?L (ref 0–0.3)
BASOPHILS RELATIVE PERCENT: 2.17 % (ref 0–3)
BUN BLDV-MCNC: 16 MG/DL (ref 9–20)
CALCIUM SERPL-MCNC: 10.2 MG/DL (ref 8.4–10.2)
CHLORIDE BLD-SCNC: 100 MMOL/L (ref 98–120)
CO2: 29 MMOL/L (ref 22–31)
CREAT SERPL-MCNC: 1.2 MG/DL (ref 0.7–1.3)
EOSINOPHILS ABSOLUTE: 0.2 X10E3/?L (ref 0–1.1)
EOSINOPHILS RELATIVE PERCENT: 4.56 % (ref 0–10)
GFR, ESTIMATED: > 60
GLUCOSE: 106 MG/DL (ref 75–110)
HCT VFR BLD CALC: 42.5 % (ref 42–52)
HEMOGLOBIN: 12.4 G/DL (ref 13.8–17.8)
LYMPHOCYTES ABSOLUTE: 1.06 X10E3/?L (ref 1–5.5)
LYMPHOCYTES RELATIVE PERCENT: 23.65 % (ref 20–51.1)
MCH RBC QN AUTO: 27.7 PG (ref 28.5–32.5)
MCHC RBC AUTO-ENTMCNC: 29.2 G/DL (ref 32–37)
MCV RBC AUTO: 95 FL (ref 80–94)
MONOCYTES ABSOLUTE: 0.54 X10E3/?L (ref 0.1–1)
MONOCYTES RELATIVE PERCENT: 12.02 % (ref 1.7–9.3)
NEUTROPHILS ABSOLUTE: 2.59 X10E3/?L (ref 2–8.1)
NEUTROPHILS RELATIVE PERCENT: 57.61 % (ref 42.2–75.2)
PDW BLD-RTO: 16.3 % (ref 10–15.5)
PLATELET # BLD: 261.9 THOU/MM3 (ref 130–400)
POTASSIUM SERPL-SCNC: 5 MMOL/L (ref 3.6–5)
RBC # BLD: 4.47 M/UL (ref 4.7–6.1)
SODIUM BLD-SCNC: 139 MMOL/L (ref 135–145)
WBC # BLD: 4.5 THOU/ML3 (ref 4.8–10.8)

## 2024-08-05 ENCOUNTER — OFFICE VISIT (OUTPATIENT)
Dept: FAMILY MEDICINE CLINIC | Age: 69
End: 2024-08-05
Payer: MEDICARE

## 2024-08-05 VITALS
HEART RATE: 56 BPM | OXYGEN SATURATION: 97 % | HEIGHT: 70 IN | WEIGHT: 152 LBS | SYSTOLIC BLOOD PRESSURE: 116 MMHG | BODY MASS INDEX: 21.76 KG/M2 | DIASTOLIC BLOOD PRESSURE: 82 MMHG

## 2024-08-05 DIAGNOSIS — M54.50 ACUTE BILATERAL LOW BACK PAIN WITHOUT SCIATICA: Primary | ICD-10-CM

## 2024-08-05 PROCEDURE — 99212 OFFICE O/P EST SF 10 MIN: CPT

## 2024-08-05 RX ORDER — CARVEDILOL 12.5 MG/1
TABLET ORAL
COMMUNITY
Start: 2024-07-16

## 2024-08-05 RX ORDER — AMLODIPINE BESYLATE 5 MG/1
10 TABLET ORAL
COMMUNITY
Start: 2024-07-09

## 2024-08-05 RX ORDER — POTASSIUM CHLORIDE 750 MG/1
TABLET, EXTENDED RELEASE ORAL
COMMUNITY
Start: 2024-06-11

## 2024-08-05 RX ORDER — PANTOPRAZOLE SODIUM 40 MG/1
TABLET, DELAYED RELEASE ORAL
COMMUNITY
Start: 2024-06-11

## 2024-08-05 ASSESSMENT — ENCOUNTER SYMPTOMS
ABDOMINAL PAIN: 0
WHEEZING: 0
SHORTNESS OF BREATH: 0
COLOR CHANGE: 0
COUGH: 0

## 2024-08-05 NOTE — PROGRESS NOTES
[facility]  [address] [place] [address]  Dept: [phone]    Date of Service:  8/5/2024    Rad Brandt is a 69 y.o. male who presents in office today with Self.    Chief Complaint   Patient presents with    Follow-up     Pt is here for a follow up. Pt states he is doing okay. Pt states he is still having the back ache.         Diagnoses / Plan:   1. Acute bilateral low back pain without sciatica   -worse since taking Lipitor, will hold for 1 month to see if this helps. Did not have it as much with Crestor.     .  Encouraged symptomatic treatment, rest, increase oral fluid intake.  Follow-up for worsening or persistent symptoms.  Patient verbalizes understanding regarding plan of care and all questions answered.    No follow-ups on file.     Subjective:   History of Present Illness:  -on coumadin for 90 days post surg. 1 month left and then is establishing with new cardiologist.  He also complains of back pain more lower, did notice have it with Crestor would take ibuprofen and would relieve this.  He is not able to take ibuprofen because he is on blood thinners at this time and believes Lipitor is actually making his pain worse.  Denies any numbness tingling, loss of bowel or bladder, trauma that precipitated this.      Current Outpatient Medications   Medication Sig Dispense Refill    amLODIPine (NORVASC) 5 MG tablet 2 tablets      carvedilol (COREG) 12.5 MG tablet       metoprolol tartrate (LOPRESSOR) 25 MG tablet       pantoprazole (PROTONIX) 40 MG tablet       potassium chloride (KLOR-CON M) 10 MEQ extended release tablet       FLUoxetine (PROZAC) 10 MG capsule TAKE 1 CAPSULE BY MOUTH DAILY 90 capsule 0    acetaminophen (TYLENOL) 500 MG tablet Take 2 tablets by mouth every 6 hours as needed      amiodarone (CORDARONE) 200 MG tablet Take by mouth      aspirin 81 MG EC tablet Take 1 tablet by mouth daily      atorvastatin (LIPITOR) 20 MG tablet Take 1 tablet by mouth

## 2024-08-09 NOTE — TELEPHONE ENCOUNTER
Pt needs to be prescribed 10mg instead of 5mg. Pt has been taking 2 5mg at a time after open heart surgery so insurance will require a script for 10mg or they will most likely stop covering the med. Pharmacist says pt has about 2 weeks of med left.

## 2024-08-12 RX ORDER — AMLODIPINE BESYLATE 10 MG/1
10 TABLET ORAL DAILY
Qty: 90 TABLET | Refills: 1 | Status: SHIPPED | OUTPATIENT
Start: 2024-08-12 | End: 2025-02-08

## 2024-10-15 ENCOUNTER — OFFICE VISIT (OUTPATIENT)
Dept: FAMILY MEDICINE CLINIC | Age: 69
End: 2024-10-15
Payer: MEDICARE

## 2024-10-15 VITALS
OXYGEN SATURATION: 97 % | WEIGHT: 158 LBS | HEART RATE: 56 BPM | BODY MASS INDEX: 22.67 KG/M2 | SYSTOLIC BLOOD PRESSURE: 138 MMHG | DIASTOLIC BLOOD PRESSURE: 88 MMHG

## 2024-10-15 DIAGNOSIS — F33.1 MODERATE EPISODE OF RECURRENT MAJOR DEPRESSIVE DISORDER (HCC): Primary | ICD-10-CM

## 2024-10-15 DIAGNOSIS — F51.01 PRIMARY INSOMNIA: ICD-10-CM

## 2024-10-15 DIAGNOSIS — I10 ESSENTIAL HYPERTENSION: ICD-10-CM

## 2024-10-15 DIAGNOSIS — F10.10 EXCESSIVE DRINKING ALCOHOL: ICD-10-CM

## 2024-10-15 DIAGNOSIS — F10.10 ALCOHOL ABUSE: ICD-10-CM

## 2024-10-15 PROCEDURE — 1123F ACP DISCUSS/DSCN MKR DOCD: CPT

## 2024-10-15 PROCEDURE — 3079F DIAST BP 80-89 MM HG: CPT

## 2024-10-15 PROCEDURE — 99214 OFFICE O/P EST MOD 30 MIN: CPT

## 2024-10-15 PROCEDURE — 3075F SYST BP GE 130 - 139MM HG: CPT

## 2024-10-15 RX ORDER — FLUOXETINE 10 MG/1
10 CAPSULE ORAL DAILY
Qty: 30 CAPSULE | Refills: 0 | Status: SHIPPED | OUTPATIENT
Start: 2024-10-15

## 2024-10-15 RX ORDER — CLONAZEPAM 0.5 MG/1
0.5 TABLET ORAL NIGHTLY PRN
Qty: 30 TABLET | Refills: 0 | Status: SHIPPED | OUTPATIENT
Start: 2024-10-15 | End: 2024-11-14

## 2024-10-15 RX ORDER — AMLODIPINE BESYLATE 5 MG/1
5 TABLET ORAL DAILY
Qty: 90 TABLET | Refills: 1
Start: 2024-10-15 | End: 2025-04-13

## 2024-10-15 RX ORDER — DULOXETIN HYDROCHLORIDE 20 MG/1
20 CAPSULE, DELAYED RELEASE ORAL DAILY
Qty: 30 CAPSULE | Refills: 3 | Status: SHIPPED | OUTPATIENT
Start: 2024-10-15

## 2024-10-15 NOTE — PROGRESS NOTES
[facility]  [address] [place] [address]  Dept: [phone]    Date of Service:  10/15/2024    Rad Brandt is a 69 y.o. male who presents in office today with Self.    Chief Complaint   Patient presents with    Discuss Medications     Patient is here today to discuss his medications.         Diagnoses / Plan:   1. Moderate episode of recurrent major depressive disorder (HCC)  Assessment & Plan:   Chronic, not at goal (unstable), changes made today: Start on Cymbalta, will stop Lexapro after titrating down.  Take 1 a day for 14 days then 1 every other day 14 days then 1 every third day for 14 days then stop.  Side effects of withdrawal from this medication are discussed, side effects from starting Cymbalta are discussed.  Not suicidal homicidal currently.  Encouraged to reduce amount of alcohol or sustain from drinking and always.  This is depressant, will likely exacerbate conditions.  Orders:  -     DULoxetine (CYMBALTA) 20 MG extended release capsule; Take 1 capsule by mouth daily, Disp-30 capsule, R-3Normal  -     FLUoxetine (PROZAC) 10 MG capsule; Take 1 capsule by mouth daily, Disp-30 capsule, R-0Normal  -     clonazePAM (KLONOPIN) 0.5 MG tablet; Take 1 tablet by mouth nightly as needed for Anxiety for up to 30 days. Max Daily Amount: 0.5 mg, Disp-30 tablet, R-0Normal  2. Essential hypertension  Assessment & Plan:   Chronic, at goal (stable), continue current treatment plan  Orders:  -     amLODIPine (NORVASC) 5 MG tablet; Take 1 tablet by mouth daily, Disp-90 tablet, R-1Adjust Sig  3. Primary insomnia  -     clonazePAM (KLONOPIN) 0.5 MG tablet; Take 1 tablet by mouth nightly as needed for Anxiety for up to 30 days. Max Daily Amount: 0.5 mg, Disp-30 tablet, R-0Normal  4. Alcohol abuse  Assessment & Plan:   Chronic, not at goal (unstable), changes made today: Will use clonazepam to help reduce alcohol consumption and avoid withdrawal.  To use no more than once at night.

## 2024-10-21 ASSESSMENT — ENCOUNTER SYMPTOMS
COUGH: 0
WHEEZING: 0
SHORTNESS OF BREATH: 0
COLOR CHANGE: 0
ABDOMINAL PAIN: 0

## 2024-10-21 NOTE — ASSESSMENT & PLAN NOTE
Chronic, not at goal (unstable), changes made today: Will use clonazepam to help reduce alcohol consumption and avoid withdrawal.  To use no more than once at night.  Side effects of benzodiazepines and dependency with this is discussed.  He verbalized understanding of this.  Will have close follow-up.

## 2024-10-21 NOTE — ASSESSMENT & PLAN NOTE
Chronic, not at goal (unstable), changes made today: Start on Cymbalta, will stop Lexapro after titrating down.  Take 1 a day for 14 days then 1 every other day 14 days then 1 every third day for 14 days then stop.  Side effects of withdrawal from this medication are discussed, side effects from starting Cymbalta are discussed.  Not suicidal homicidal currently.  Encouraged to reduce amount of alcohol or sustain from drinking and always.  This is depressant, will likely exacerbate conditions.

## 2024-11-04 ENCOUNTER — PATIENT MESSAGE (OUTPATIENT)
Dept: FAMILY MEDICINE CLINIC | Age: 69
End: 2024-11-04

## 2024-11-06 DIAGNOSIS — F33.1 MODERATE EPISODE OF RECURRENT MAJOR DEPRESSIVE DISORDER (HCC): ICD-10-CM

## 2024-11-06 RX ORDER — DULOXETIN HYDROCHLORIDE 30 MG/1
30 CAPSULE, DELAYED RELEASE ORAL DAILY
Qty: 30 CAPSULE | Refills: 0 | Status: SHIPPED | OUTPATIENT
Start: 2024-11-06

## 2024-11-12 ENCOUNTER — OFFICE VISIT (OUTPATIENT)
Dept: FAMILY MEDICINE CLINIC | Age: 69
End: 2024-11-12
Payer: MEDICARE

## 2024-11-12 VITALS
BODY MASS INDEX: 22.1 KG/M2 | WEIGHT: 154 LBS | SYSTOLIC BLOOD PRESSURE: 138 MMHG | DIASTOLIC BLOOD PRESSURE: 88 MMHG | OXYGEN SATURATION: 98 % | HEART RATE: 58 BPM

## 2024-11-12 DIAGNOSIS — F33.1 MODERATE EPISODE OF RECURRENT MAJOR DEPRESSIVE DISORDER (HCC): ICD-10-CM

## 2024-11-12 DIAGNOSIS — Z00.00 MEDICARE ANNUAL WELLNESS VISIT, SUBSEQUENT: Primary | ICD-10-CM

## 2024-11-12 PROCEDURE — G0439 PPPS, SUBSEQ VISIT: HCPCS

## 2024-11-12 PROCEDURE — 1123F ACP DISCUSS/DSCN MKR DOCD: CPT

## 2024-11-12 PROCEDURE — 3075F SYST BP GE 130 - 139MM HG: CPT

## 2024-11-12 PROCEDURE — 1160F RVW MEDS BY RX/DR IN RCRD: CPT

## 2024-11-12 PROCEDURE — 3079F DIAST BP 80-89 MM HG: CPT

## 2024-11-12 PROCEDURE — 1159F MED LIST DOCD IN RCRD: CPT

## 2024-11-12 RX ORDER — FLUOXETINE 10 MG/1
10 CAPSULE ORAL DAILY
Qty: 30 CAPSULE | Refills: 0 | Status: SHIPPED | OUTPATIENT
Start: 2024-11-12

## 2024-11-12 ASSESSMENT — PATIENT HEALTH QUESTIONNAIRE - PHQ9: DEPRESSION UNABLE TO ASSESS: PT REFUSES

## 2024-11-12 ASSESSMENT — LIFESTYLE VARIABLES: HOW OFTEN DO YOU HAVE A DRINK CONTAINING ALCOHOL: PATIENT DECLINED

## 2024-11-12 NOTE — PROGRESS NOTES
Medicare Annual Wellness Visit    Rad Brandt is here for Medicare AWV (Patient is here today for medicare wellness.)    Assessment & Plan   Medicare annual wellness visit, subsequent  Moderate episode of recurrent major depressive disorder (HCC)  Assessment & Plan:   Chronic, not at goal (unstable), continue current treatment plan not doing well now ongoing we increased medicine to 30 mg.  Will stop this, titrate back down and off and start back on Prozac.  He does have worsening symptoms and we have tried multiple SSRIs and no SNRIs.  Will suggest seeing psychiatric services behavioral health to help further with this.  He is not suicidal or homicidal does drink alcohol daily this is discussed he will attempt again to reduce this.  Orders:  -     External Referral To Psychiatry  -     FLUoxetine (PROZAC) 10 MG capsule; Take 1 capsule by mouth daily, Disp-30 capsule, R-0Normal  Recommendations for Preventive Services Due: see orders and patient instructions/AVS.  Recommended screening schedule for the next 5-10 years is provided to the patient in written form: see Patient Instructions/AVS.     Return if symptoms worsen or fail to improve.     Subjective   The following acute and/or chronic problems were also addressed today:  Depression and anxiety- more severe currently, made worse from political election.     Patient's complete Health Risk Assessment and screening values have been reviewed and are found in Flowsheets. The following problems were reviewed today and where indicated follow up appointments were made and/or referrals ordered.    Positive Risk Factor Screenings with Interventions:      Depression:  Depression Unable to Assess: Pt refuses                                    Objective   Vitals:    11/12/24 0856 11/12/24 0943   BP: (!) 142/86 138/88   Site: Right Upper Arm    Position: Sitting    Pulse: 58    SpO2: 98%    Weight: 69.9 kg (154 lb)       Body mass index is 22.1 kg/m².        Physical

## 2024-11-18 NOTE — ASSESSMENT & PLAN NOTE
Chronic, not at goal (unstable), continue current treatment plan not doing well now ongoing we increased medicine to 30 mg.  Will stop this, titrate back down and off and start back on Prozac.  He does have worsening symptoms and we have tried multiple SSRIs and no SNRIs.  Will suggest seeing psychiatric services behavioral health to help further with this.  He is not suicidal or homicidal does drink alcohol daily this is discussed he will attempt again to reduce this.

## 2024-11-27 DIAGNOSIS — F33.1 MODERATE EPISODE OF RECURRENT MAJOR DEPRESSIVE DISORDER (HCC): ICD-10-CM

## 2024-11-27 DIAGNOSIS — I10 ESSENTIAL HYPERTENSION: ICD-10-CM

## 2024-11-27 RX ORDER — CARVEDILOL 12.5 MG/1
12.5 TABLET ORAL 2 TIMES DAILY WITH MEALS
Qty: 60 TABLET | Refills: 3 | Status: SHIPPED | OUTPATIENT
Start: 2024-11-27

## 2024-11-27 RX ORDER — FLUOXETINE 10 MG/1
10 CAPSULE ORAL DAILY
Qty: 30 CAPSULE | Refills: 0 | Status: SHIPPED | OUTPATIENT
Start: 2024-11-27

## 2024-11-27 RX ORDER — AMLODIPINE BESYLATE 5 MG/1
5 TABLET ORAL DAILY
Qty: 90 TABLET | Refills: 1 | Status: SHIPPED | OUTPATIENT
Start: 2024-11-27 | End: 2025-05-26

## 2024-11-27 NOTE — TELEPHONE ENCOUNTER
Rad Brandt is requesting a refill on the following medication(s):  Requested Prescriptions     Pending Prescriptions Disp Refills    amLODIPine (NORVASC) 5 MG tablet 90 tablet 1     Sig: Take 1 tablet by mouth daily    FLUoxetine (PROZAC) 10 MG capsule 30 capsule 0     Sig: Take 1 capsule by mouth daily       Last Visit Date (If Applicable):  11/12/2024    Next Visit Date:    Visit date not found

## 2024-11-27 NOTE — TELEPHONE ENCOUNTER
Rad Brandt is requesting a refill on the following medication(s):  Requested Prescriptions     Pending Prescriptions Disp Refills    amLODIPine (NORVASC) 5 MG tablet 90 tablet 1     Sig: Take 1 tablet by mouth daily    FLUoxetine (PROZAC) 10 MG capsule 30 capsule 0     Sig: Take 1 capsule by mouth daily    carvedilol (COREG) 12.5 MG tablet 60 tablet        Last Visit Date (If Applicable):  11/12/2024    Next Visit Date:    Visit date not found

## 2025-01-22 ENCOUNTER — PATIENT MESSAGE (OUTPATIENT)
Dept: FAMILY MEDICINE CLINIC | Age: 70
End: 2025-01-22

## 2025-01-22 DIAGNOSIS — N52.8 OTHER MALE ERECTILE DYSFUNCTION: Primary | ICD-10-CM

## 2025-01-22 DIAGNOSIS — N52.1 ERECTILE DYSFUNCTION DUE TO DISEASES CLASSIFIED ELSEWHERE: ICD-10-CM

## 2025-01-23 RX ORDER — SILDENAFIL 50 MG/1
50 TABLET, FILM COATED ORAL DAILY PRN
Qty: 30 TABLET | Refills: 2 | Status: SHIPPED | OUTPATIENT
Start: 2025-01-23

## 2025-01-25 DIAGNOSIS — F33.1 MODERATE EPISODE OF RECURRENT MAJOR DEPRESSIVE DISORDER (HCC): ICD-10-CM

## 2025-01-27 RX ORDER — FLUOXETINE 10 MG/1
10 CAPSULE ORAL DAILY
Qty: 30 CAPSULE | Refills: 0 | Status: SHIPPED | OUTPATIENT
Start: 2025-01-27

## 2025-01-27 NOTE — TELEPHONE ENCOUNTER
Rad Brandt is requesting a refill on the following medication(s):  Requested Prescriptions     Pending Prescriptions Disp Refills    FLUoxetine (PROZAC) 10 MG capsule [Pharmacy Med Name: FLUoxetine HCL 10 MG CAPSULE] 30 capsule 0     Sig: TAKE 1 CAPSULE BY MOUTH DAILY       Last Visit Date (If Applicable):  11/12/2024    Next Visit Date:    Visit date not found

## 2025-02-25 DIAGNOSIS — F33.1 MODERATE EPISODE OF RECURRENT MAJOR DEPRESSIVE DISORDER (HCC): ICD-10-CM

## 2025-02-25 RX ORDER — FLUOXETINE 10 MG/1
10 CAPSULE ORAL DAILY
Qty: 30 CAPSULE | Refills: 0 | Status: SHIPPED | OUTPATIENT
Start: 2025-02-25

## 2025-04-02 DIAGNOSIS — F33.1 MODERATE EPISODE OF RECURRENT MAJOR DEPRESSIVE DISORDER (HCC): ICD-10-CM

## 2025-04-02 RX ORDER — FLUOXETINE 10 MG/1
10 CAPSULE ORAL DAILY
Qty: 30 CAPSULE | Refills: 0 | Status: SHIPPED | OUTPATIENT
Start: 2025-04-02

## 2025-04-18 RX ORDER — CARVEDILOL 12.5 MG/1
12.5 TABLET ORAL 2 TIMES DAILY WITH MEALS
Qty: 60 TABLET | Refills: 3 | Status: SHIPPED | OUTPATIENT
Start: 2025-04-18

## 2025-05-01 DIAGNOSIS — F33.1 MODERATE EPISODE OF RECURRENT MAJOR DEPRESSIVE DISORDER (HCC): ICD-10-CM

## 2025-05-01 RX ORDER — FLUOXETINE 10 MG/1
10 CAPSULE ORAL DAILY
Qty: 30 CAPSULE | Refills: 0 | Status: SHIPPED | OUTPATIENT
Start: 2025-05-01

## 2025-05-29 DIAGNOSIS — F33.1 MODERATE EPISODE OF RECURRENT MAJOR DEPRESSIVE DISORDER (HCC): ICD-10-CM

## 2025-05-29 RX ORDER — FLUOXETINE 10 MG/1
10 CAPSULE ORAL DAILY
Qty: 30 CAPSULE | Refills: 0 | Status: SHIPPED | OUTPATIENT
Start: 2025-05-29

## 2025-05-29 NOTE — TELEPHONE ENCOUNTER
Rad Brandt is requesting a refill on the following medication(s):  Requested Prescriptions     Pending Prescriptions Disp Refills    FLUoxetine (PROZAC) 10 MG capsule [Pharmacy Med Name: FLUoxetine HCL 10 MG CAPSULE] 30 capsule 0     Sig: TAKE 1 CAPSULE BY MOUTH DAILY       Last Visit Date (If Applicable):  11/12/2024    Next Visit Date:    11/13/2025

## 2025-06-24 DIAGNOSIS — I10 ESSENTIAL HYPERTENSION: ICD-10-CM

## 2025-06-24 DIAGNOSIS — F33.1 MODERATE EPISODE OF RECURRENT MAJOR DEPRESSIVE DISORDER (HCC): ICD-10-CM

## 2025-06-24 RX ORDER — FLUOXETINE 10 MG/1
10 CAPSULE ORAL DAILY
Qty: 30 CAPSULE | Refills: 0 | Status: SHIPPED | OUTPATIENT
Start: 2025-06-24

## 2025-06-24 RX ORDER — AMLODIPINE BESYLATE 5 MG/1
5 TABLET ORAL DAILY
Qty: 90 TABLET | Refills: 1 | Status: SHIPPED | OUTPATIENT
Start: 2025-06-24 | End: 2025-12-21